# Patient Record
Sex: MALE | Race: WHITE | HISPANIC OR LATINO | Employment: FULL TIME | URBAN - METROPOLITAN AREA
[De-identification: names, ages, dates, MRNs, and addresses within clinical notes are randomized per-mention and may not be internally consistent; named-entity substitution may affect disease eponyms.]

---

## 2017-10-02 ENCOUNTER — OFFICE VISIT - RIVER FALLS (OUTPATIENT)
Dept: FAMILY MEDICINE | Facility: CLINIC | Age: 27
End: 2017-10-02

## 2017-11-13 ENCOUNTER — OFFICE VISIT - RIVER FALLS (OUTPATIENT)
Dept: FAMILY MEDICINE | Facility: CLINIC | Age: 27
End: 2017-11-13

## 2017-11-13 ASSESSMENT — MIFFLIN-ST. JEOR: SCORE: 2227.78

## 2018-03-29 ENCOUNTER — OFFICE VISIT - RIVER FALLS (OUTPATIENT)
Dept: FAMILY MEDICINE | Facility: CLINIC | Age: 28
End: 2018-03-29

## 2018-04-26 ENCOUNTER — OFFICE VISIT - RIVER FALLS (OUTPATIENT)
Dept: FAMILY MEDICINE | Facility: CLINIC | Age: 28
End: 2018-04-26

## 2018-06-20 ENCOUNTER — OFFICE VISIT - RIVER FALLS (OUTPATIENT)
Dept: FAMILY MEDICINE | Facility: CLINIC | Age: 28
End: 2018-06-20

## 2018-06-21 LAB
CREAT SERPL-MCNC: 0.95 MG/DL (ref 0.6–1.35)
GLUCOSE BLD-MCNC: 102 MG/DL (ref 65–139)
HBA1C MFR BLD: 5.2 %

## 2018-08-24 ENCOUNTER — OFFICE VISIT - RIVER FALLS (OUTPATIENT)
Dept: FAMILY MEDICINE | Facility: CLINIC | Age: 28
End: 2018-08-24

## 2018-08-31 ENCOUNTER — OFFICE VISIT - RIVER FALLS (OUTPATIENT)
Dept: FAMILY MEDICINE | Facility: CLINIC | Age: 28
End: 2018-08-31

## 2018-08-31 ASSESSMENT — MIFFLIN-ST. JEOR: SCORE: 2219.62

## 2018-11-24 ENCOUNTER — OFFICE VISIT - RIVER FALLS (OUTPATIENT)
Dept: FAMILY MEDICINE | Facility: CLINIC | Age: 28
End: 2018-11-24

## 2018-11-26 ENCOUNTER — OFFICE VISIT - RIVER FALLS (OUTPATIENT)
Dept: FAMILY MEDICINE | Facility: CLINIC | Age: 28
End: 2018-11-26

## 2019-03-07 ENCOUNTER — OFFICE VISIT - RIVER FALLS (OUTPATIENT)
Dept: FAMILY MEDICINE | Facility: CLINIC | Age: 29
End: 2019-03-07

## 2019-03-11 LAB
AMPHETAMINE - HISTORICAL: 9471 NG/ML
AMPHETAMINE URINE - HISTORICAL: ABNORMAL
AMPHETAMINES UR QL: POSITIVE NG/ML
BARBITURATE URINE - HISTORICAL: ABNORMAL
BARBITURATES UR QL SCN: NEGATIVE NG/ML
BENZODIAZ UR QL SCN: NEGATIVE NG/ML
BENZODIAZEPINES: ABNORMAL
COCAINE METAB URINE - HISTORICAL: NEGATIVE NG/ML
COCAINE METABOLITE: ABNORMAL
COMMENTS: ABNORMAL
CREAT UR-MCNC: 184.5 MG/DL
METHADONE URINE - HISTORICAL: NEGATIVE NG/ML
METHADONE: ABNORMAL
METHAMPHETAMINE,URINE - HISTORICAL: ABNORMAL
METHAMPHETAMINE,URINE - HISTORICAL: NEGATIVE NG/ML
OPIATES UR QL SCN: NEGATIVE NG/ML
OPIATES: ABNORMAL
OXIDANTS URINE: NEGATIVE MCG/ML
OXYCODONE URINE - HISTORICAL: NEGATIVE NG/ML
OXYCODONE: ABNORMAL
PCP (PHENCYCLIDINE) - HISTORICAL: ABNORMAL
PCP UR QL SCN: NEGATIVE NG/ML
PH UR STRIP: 5.2 [PH] (ref 4.5–9)
PRESCRIBED DRUG 1: ABNORMAL
THC 50 URINE - HISTORICAL: NEGATIVE NG/ML
THC METABOLITE: ABNORMAL

## 2019-06-28 ENCOUNTER — OFFICE VISIT - RIVER FALLS (OUTPATIENT)
Dept: FAMILY MEDICINE | Facility: CLINIC | Age: 29
End: 2019-06-28

## 2019-06-28 ASSESSMENT — MIFFLIN-ST. JEOR: SCORE: 2201.48

## 2019-07-05 ENCOUNTER — OFFICE VISIT - RIVER FALLS (OUTPATIENT)
Dept: FAMILY MEDICINE | Facility: CLINIC | Age: 29
End: 2019-07-05

## 2019-07-05 LAB
ALBUMIN UR-MCNC: NEGATIVE G/DL
BILIRUB UR QL STRIP: NEGATIVE
GLUCOSE UR STRIP-MCNC: NEGATIVE MG/DL
HGB UR QL STRIP: NEGATIVE
KETONES UR STRIP-MCNC: NEGATIVE MG/DL
LEUKOCYTE ESTERASE UR QL STRIP: NEGATIVE
NITRATE UR QL: NEGATIVE
PH UR STRIP: NORMAL [PH] (ref 5–8)
SP GR UR STRIP: 1.02 (ref 1–1.03)

## 2019-07-05 ASSESSMENT — MIFFLIN-ST. JEOR: SCORE: 2207.83

## 2019-10-02 ENCOUNTER — OFFICE VISIT - RIVER FALLS (OUTPATIENT)
Dept: FAMILY MEDICINE | Facility: CLINIC | Age: 29
End: 2019-10-02

## 2019-10-02 ASSESSMENT — MIFFLIN-ST. JEOR: SCORE: 2224.16

## 2019-12-02 ENCOUNTER — COMMUNICATION - RIVER FALLS (OUTPATIENT)
Dept: FAMILY MEDICINE | Facility: CLINIC | Age: 29
End: 2019-12-02

## 2019-12-26 ENCOUNTER — COMMUNICATION - RIVER FALLS (OUTPATIENT)
Dept: FAMILY MEDICINE | Facility: CLINIC | Age: 29
End: 2019-12-26

## 2019-12-30 ENCOUNTER — OFFICE VISIT - RIVER FALLS (OUTPATIENT)
Dept: FAMILY MEDICINE | Facility: CLINIC | Age: 29
End: 2019-12-30

## 2019-12-30 ASSESSMENT — MIFFLIN-ST. JEOR: SCORE: 2216.9

## 2020-01-30 ENCOUNTER — COMMUNICATION - RIVER FALLS (OUTPATIENT)
Dept: FAMILY MEDICINE | Facility: CLINIC | Age: 30
End: 2020-01-30

## 2020-04-08 ENCOUNTER — COMMUNICATION - RIVER FALLS (OUTPATIENT)
Dept: FAMILY MEDICINE | Facility: CLINIC | Age: 30
End: 2020-04-08

## 2020-04-17 ENCOUNTER — OFFICE VISIT - RIVER FALLS (OUTPATIENT)
Dept: FAMILY MEDICINE | Facility: CLINIC | Age: 30
End: 2020-04-17

## 2020-05-27 ENCOUNTER — COMMUNICATION - RIVER FALLS (OUTPATIENT)
Dept: FAMILY MEDICINE | Facility: CLINIC | Age: 30
End: 2020-05-27

## 2020-05-28 ENCOUNTER — COMMUNICATION - RIVER FALLS (OUTPATIENT)
Dept: FAMILY MEDICINE | Facility: CLINIC | Age: 30
End: 2020-05-28

## 2020-06-04 ENCOUNTER — COMMUNICATION - RIVER FALLS (OUTPATIENT)
Dept: FAMILY MEDICINE | Facility: CLINIC | Age: 30
End: 2020-06-04

## 2020-06-17 ENCOUNTER — COMMUNICATION - RIVER FALLS (OUTPATIENT)
Dept: FAMILY MEDICINE | Facility: CLINIC | Age: 30
End: 2020-06-17

## 2021-04-20 ENCOUNTER — OFFICE VISIT - RIVER FALLS (OUTPATIENT)
Dept: FAMILY MEDICINE | Facility: CLINIC | Age: 31
End: 2021-04-20

## 2021-04-20 ASSESSMENT — MIFFLIN-ST. JEOR: SCORE: 2156.12

## 2021-08-03 ENCOUNTER — OFFICE VISIT - RIVER FALLS (OUTPATIENT)
Dept: FAMILY MEDICINE | Facility: CLINIC | Age: 31
End: 2021-08-03

## 2021-08-03 ASSESSMENT — MIFFLIN-ST. JEOR: SCORE: 2169.72

## 2021-08-06 LAB
ALBUMIN UR-MCNC: NEGATIVE G/DL
APPEARANCE UR: CLEAR
BILIRUB UR QL STRIP: NEGATIVE
COLOR UR AUTO: YELLOW
GLUCOSE UR STRIP-MCNC: NEGATIVE MG/DL
HGB UR QL STRIP: NEGATIVE
KETONES UR STRIP-MCNC: NEGATIVE MG/DL
LEUKOCYTE ESTERASE UR QL STRIP: NEGATIVE
NITRATE UR QL: NEGATIVE
PH UR STRIP: 5.5 [PH]
SP GR UR STRIP: 1.03
UROBILINOGEN UR STRIP-MCNC: NORMAL MG/DL

## 2021-08-16 ENCOUNTER — OFFICE VISIT - RIVER FALLS (OUTPATIENT)
Dept: FAMILY MEDICINE | Facility: CLINIC | Age: 31
End: 2021-08-16

## 2021-12-02 ENCOUNTER — OFFICE VISIT - RIVER FALLS (OUTPATIENT)
Dept: FAMILY MEDICINE | Facility: CLINIC | Age: 31
End: 2021-12-02

## 2022-01-31 ENCOUNTER — COMMUNICATION - RIVER FALLS (OUTPATIENT)
Dept: FAMILY MEDICINE | Facility: CLINIC | Age: 32
End: 2022-01-31

## 2022-02-11 VITALS
DIASTOLIC BLOOD PRESSURE: 86 MMHG | HEART RATE: 98 BPM | SYSTOLIC BLOOD PRESSURE: 128 MMHG | HEART RATE: 84 BPM | DIASTOLIC BLOOD PRESSURE: 78 MMHG | TEMPERATURE: 98 F | SYSTOLIC BLOOD PRESSURE: 120 MMHG | RESPIRATION RATE: 16 BRPM | TEMPERATURE: 97.4 F | HEIGHT: 75 IN | BODY MASS INDEX: 32.2 KG/M2 | WEIGHT: 260.4 LBS | WEIGHT: 259 LBS | BODY MASS INDEX: 32.38 KG/M2 | HEIGHT: 75 IN

## 2022-02-11 VITALS
HEART RATE: 88 BPM | RESPIRATION RATE: 16 BRPM | WEIGHT: 252 LBS | TEMPERATURE: 98.4 F | DIASTOLIC BLOOD PRESSURE: 80 MMHG | HEIGHT: 75 IN | SYSTOLIC BLOOD PRESSURE: 110 MMHG | BODY MASS INDEX: 31.33 KG/M2

## 2022-02-11 VITALS
DIASTOLIC BLOOD PRESSURE: 82 MMHG | HEIGHT: 75 IN | RESPIRATION RATE: 16 BRPM | BODY MASS INDEX: 32.83 KG/M2 | HEART RATE: 80 BPM | TEMPERATURE: 97.8 F | WEIGHT: 264 LBS | SYSTOLIC BLOOD PRESSURE: 132 MMHG

## 2022-02-11 VITALS
TEMPERATURE: 97.5 F | BODY MASS INDEX: 34.73 KG/M2 | SYSTOLIC BLOOD PRESSURE: 136 MMHG | HEART RATE: 84 BPM | DIASTOLIC BLOOD PRESSURE: 88 MMHG | WEIGHT: 276 LBS | WEIGHT: 274 LBS | SYSTOLIC BLOOD PRESSURE: 126 MMHG | HEART RATE: 76 BPM | BODY MASS INDEX: 34.48 KG/M2 | DIASTOLIC BLOOD PRESSURE: 96 MMHG

## 2022-02-11 VITALS
WEIGHT: 260 LBS | HEART RATE: 68 BPM | WEIGHT: 264.8 LBS | DIASTOLIC BLOOD PRESSURE: 86 MMHG | SYSTOLIC BLOOD PRESSURE: 132 MMHG | SYSTOLIC BLOOD PRESSURE: 122 MMHG | HEIGHT: 75 IN | BODY MASS INDEX: 32.92 KG/M2 | DIASTOLIC BLOOD PRESSURE: 88 MMHG | BODY MASS INDEX: 32.72 KG/M2 | TEMPERATURE: 98.4 F | TEMPERATURE: 98.9 F | HEART RATE: 90 BPM

## 2022-02-11 VITALS
SYSTOLIC BLOOD PRESSURE: 130 MMHG | HEART RATE: 80 BPM | TEMPERATURE: 97.2 F | DIASTOLIC BLOOD PRESSURE: 80 MMHG | BODY MASS INDEX: 32.63 KG/M2 | HEIGHT: 75 IN | WEIGHT: 262.4 LBS

## 2022-02-11 VITALS
DIASTOLIC BLOOD PRESSURE: 84 MMHG | HEART RATE: 79 BPM | WEIGHT: 259 LBS | DIASTOLIC BLOOD PRESSURE: 78 MMHG | WEIGHT: 263 LBS | OXYGEN SATURATION: 98 % | HEIGHT: 75 IN | WEIGHT: 259.6 LBS | RESPIRATION RATE: 16 BRPM | BODY MASS INDEX: 32.67 KG/M2 | SYSTOLIC BLOOD PRESSURE: 124 MMHG | DIASTOLIC BLOOD PRESSURE: 82 MMHG | SYSTOLIC BLOOD PRESSURE: 122 MMHG | TEMPERATURE: 97.3 F | HEART RATE: 80 BPM | TEMPERATURE: 98 F | SYSTOLIC BLOOD PRESSURE: 138 MMHG | BODY MASS INDEX: 32.59 KG/M2 | BODY MASS INDEX: 32.7 KG/M2 | HEART RATE: 88 BPM

## 2022-02-11 VITALS
RESPIRATION RATE: 16 BRPM | DIASTOLIC BLOOD PRESSURE: 78 MMHG | WEIGHT: 249 LBS | TEMPERATURE: 96.3 F | BODY MASS INDEX: 30.96 KG/M2 | HEART RATE: 80 BPM | SYSTOLIC BLOOD PRESSURE: 112 MMHG | HEIGHT: 75 IN

## 2022-02-11 VITALS — BODY MASS INDEX: 31.71 KG/M2 | HEIGHT: 75 IN

## 2022-02-11 VITALS
BODY MASS INDEX: 33.47 KG/M2 | WEIGHT: 266 LBS | DIASTOLIC BLOOD PRESSURE: 90 MMHG | SYSTOLIC BLOOD PRESSURE: 134 MMHG | HEART RATE: 88 BPM

## 2022-02-11 VITALS — BODY MASS INDEX: 33.02 KG/M2 | HEIGHT: 75 IN

## 2022-02-11 VITALS — HEIGHT: 75 IN | BODY MASS INDEX: 31.33 KG/M2

## 2022-02-12 VITALS
SYSTOLIC BLOOD PRESSURE: 136 MMHG | DIASTOLIC BLOOD PRESSURE: 86 MMHG | BODY MASS INDEX: 33.34 KG/M2 | HEART RATE: 84 BPM | WEIGHT: 265 LBS

## 2022-02-16 NOTE — PROGRESS NOTES
Patient:   RODNEY FLETCHER            MRN: [id number]            FIN: [id number]               Age:   28 years     Sex:  Male     :  1990   Associated Diagnoses:   ADD (attention deficit disorder); Elevated blood pressure reading; Back pain   Author:   Amaya Donohue      Visit Information      Date of Service: 2019 10:40 am  Performing Location: [facility]  Encounter#: [id number]      Primary Care Provider (PCP):  Amaya Donohue    NPI# [id number]      Referring Provider:  Amaya Donohue    NPI# [id number]      Chief Complaint   3/7/2019 10:53 AM CST    f/u ADD. Discuss tramadol refill.      History of Present Illness   Chief complaint confirmed and reviewed with patient. Patient comes in today for follow up of ADD and refill of medications.  pdmp shows last dispense of adderall 2/10/19, no documented dispense of tramadol in past 6 months (last refill from me 2018) The patient reports his back pain is somewhat improved, previously 9/10 in severity, now 6-7/10 in severity. He intermittently performs PT exercises at home. He decreased smoking from 1 1/2 ppd to now 1/2 ppd. He does not follow a specific diet noting he drinks about 3 energy drinks per day.  back pain maintained on meloxicam but he will use prn tramadol if pain too intense       Review of Systems   Constitutional:  Negative.    Respiratory:  Negative.    Cardiovascular:  Negative.    Gastrointestinal:  Negative.    Genitourinary:  Negative.    Hematology/Lymphatics:  Negative.    Musculoskeletal:  Negative except as documented in history of present illness.    Neurologic:  Negative.    Psychiatric:  Negative except as documented in history of present illness.       Health Status   Allergies:    Allergic Reactions (Selected)  Severity Not Documented  Iodine (Scratchy throat and swelling)   Medications:  (Selected)   Prescriptions  Prescribed  Adderall 20 mg oral tablet: = 1 tab(s) ( 20 mg ), PO, BID, # 60  tab(s), 0 Refill(s), Type: Maintenance, Pharmacy: [id number], okay to fill 12/24/18, 1 tab(s) Oral bid  Adderall 20 mg oral tablet: = 1 tab(s) ( 20 mg ), PO, BID, # 60 tab(s), 0 Refill(s), Type: Maintenance, Pharmacy: [id number], okay to fill 4/6/19, 1 tab(s) Oral bid  meloxicam 15 mg oral tablet: = 1 tab(s) ( 15 mg ), PO, Daily, # 90 tab(s), 1 Refill(s), Type: Maintenance, Pharmacy: [id number], 1 tab(s) Oral daily  traMADol 50 mg oral tablet: = 1 tab(s) ( 50 mg ), PO, q4hr, PRN: for pain, # 30 tab(s), 0 Refill(s), Type: Maintenance, Pharmacy: [id number], 1 tab(s) Oral q4 hrs,PRN:for pain   Problem list:    All Problems  Cigarette Smoker / ICD-9-.1 / Confirmed  ADD (attention deficit disorder) / SNOMED CT 340116355 / Confirmed  Family History of Diabetes Mellitus (DM) / ICD-9-CM V18.0 / Confirmed  Low back pain / SNOMED CT 387105194 / Confirmed  Obesity / ICD-9-.00 / Probable      Histories   Past Medical History:    Resolved  Back pain ([id number]):  Resolved.   Family History:    Diabetes mellitus  Sister  Brother  Diabetes mellitus - adult onset  Mother  Father     Procedure history:    No active procedure history items have been selected or recorded.   Social History:        Tobacco Assessment: Current            Current, Cigarettes, Snuff      Physical Examination   Vital Signs   3/7/2019 10:53 AM CST Peripheral Pulse Rate 88 bpm    Pulse Site Radial artery    HR Method Manual    Systolic Blood Pressure 134 mmHg  HI    Diastolic Blood Pressure 90 mmHg  HI    Mean Arterial Pressure 105 mmHg    BP Site Right arm    BP Method Manual      Measurements from flowsheet : Measurements   3/7/2019 10:53 AM CST    Weight Measured - Standard                266 lb     General:  Alert and oriented, No acute distress.    HENT:  Normocephalic.    Neck:  Supple.    Respiratory:  Lungs are clear to auscultation, Respirations are non-labored, Breath  sounds are equal.    Cardiovascular:  Normal rate, Regular rhythm, No murmur, No gallop, No edema.    Musculoskeletal:  Normal range of motion.    Integumentary:  Warm, Dry, Pink.    Neurologic:  Alert, Oriented, Normal sensory, Normal motor function.    Psychiatric:  Cooperative, Appropriate mood & affect.       Review / Management   Results review:  Urine drug screen today. .       Impression and Plan   Diagnosis     ADD (attention deficit disorder) (PXH71-CU F98.8).     Plan:  Urine Drug Screen today.   Refilled Adderall today.   Follow up in three months. .    Patient Instructions:       Counseled: Patient, Smoking cessation, Verbalized understanding.    Orders     Orders (Selected)   Prescriptions  Prescribed  Adderall 20 mg oral tablet: = 1 tab(s) ( 20 mg ), PO, BID, # 60 tab(s), 0 Refill(s), Type: Maintenance, Pharmacy: [id number], okay to fill 12/24/18, 1 tab(s) Oral bid  Adderall 20 mg oral tablet: = 1 tab(s) ( 20 mg ), PO, BID, # 60 tab(s), 0 Refill(s), Type: Maintenance, Pharmacy: [facility] [id number], okay to fill 4/6/19, 1 tab(s) Oral bid  meloxicam 15 mg oral tablet: = 1 tab(s) ( 15 mg ), PO, Daily, # 90 tab(s), 1 Refill(s), Type: Maintenance, Pharmacy: [facility] [id number], 1 tab(s) Oral daily  traMADol 50 mg oral tablet: = 1 tab(s) ( 50 mg ), PO, q4hr, PRN: for pain, # 30 tab(s), 0 Refill(s), Type: Maintenance, Pharmacy: [facility] [id number], 1 tab(s) Oral q4 hrs,PRN:for pain.     Diagnosis     Elevated blood pressure reading (SJT67-AN R03.0).     Back pain (TUR17-GX M54.9).     Plan:  Tramadol and Meloxicam have been refilled. Continue PT exercises.     Patient has had elevated blood pressure for several months with readings of 134/90 today and 138/89 in 11/2018. Discussed management options with the patient including lifestyle modifications with diet, exercise and smoking cessation versus decreasing Adderall dosing versus starting antihypertensive medications. As  this point, the patient elected to proceed with lifestyle modifications. Continue to monitor at follow up appointments. .

## 2022-02-16 NOTE — TELEPHONE ENCOUNTER
---------------------  From: Margaret Verma CMA ([facility] Message Pool (32224_[facility]))   To: Amaya Donohue;     Sent: 1/25/2019 11:12:08 AM CST  Subject: adderall     Pt stopped by with CSS note stating he went to  Adderall refill at [facility] but they did not have enough to fill entire order. He is requesting refill to be sent to [facility] in [place] (updated in chart). Is due for a visit next month.---------------------  From: Amaya Donohue   To: [facility] (32224_[facility]);     Sent: 1/25/2019 11:18:07 AM CST  Subject: RE: adderall     donePt notified. Understands appt due next month.

## 2022-02-16 NOTE — PROGRESS NOTES
Patient:   RODNEY FLETCHER            MRN: [id number]            FIN: [id number]               Age:   27 years     Sex:  Male     :  1990   Associated Diagnoses:   ADD (attention deficit disorder); Hypertension; Low back pain; Prediabetes   Author:   Amaya Donohue      Chief Complaint   2018 8:34 AM CDT    f/u ADD and LBP. Has started PT with a friend who is a physical therapist.      History of Present Illness   PPC for LBP: earlene is a PT and  has been helping him, PT out of network is very expensive, in comparison to 2018 it is 15% better, does exercises at night before going to bed, stretches every morning,  able to participate with the kids,  we have written a ltter requesting exclusion from certain drill exercises, he has to turn this into his drill trevin, last letter written for 90d and will need to be updated today  needs refill of his adderall; feels it is beneficial, pdmp and  MN show no refills for pt in past 3 months, I contacted [facility] pharmacist at [facility] who confirmed pt picked up prescription on 18, pt will need to pick this month up early because he has  drill next week  elevated BP: he feels it is from snacking, but slight rise correlates to adderall use      Review of Systems   Constitutional:  Negative.    Respiratory:  Negative.    Cardiovascular:  Negative except as documented in history of present illness.    Gastrointestinal:  Negative.    Genitourinary:  Negative.    Immunologic:  Negative.    Musculoskeletal:       Back pain: In the lower region.    Integumentary:  Negative.    Neurologic:  Negative except as documented in history of present illness.    Psychiatric:  Negative except as documented in history of present illness.              Health Status   Allergies:    Allergic Reactions (Selected)  Severity Not Documented  Iodine (Scratchy throat and swelling)   Medications:  (Selected)   Prescriptions  Prescribed  Adderall 20 mg oral tablet: 1 tab(s)  ( 20 mg ), PO, BID, # 60 tab(s), 0 Refill(s), Type: Maintenance, Pharmacy: [facility] #[id number], 1 tab(s) po bid  Adderall 20 mg oral tablet: 1 tab(s) ( 20 mg ), PO, BID, # 60 tab(s), 0 Refill(s), Type: Maintenance, Pharmacy: [facility] #[id number], okay to fill 5/25/18, 1 tab(s) po bid  meloxicam 15 mg oral tablet: 1 tab(s) ( 15 mg ), PO, Daily, # 90 tab(s), 1 Refill(s), Type: Maintenance, Pharmacy: [facility] #[id number], 1 tab(s) po daily  traMADol 50 mg oral tablet: 1 tab(s) ( 50 mg ), PO, q4hr, PRN: for pain, # 30 tab(s), 0 Refill(s), Type: Maintenance, Pharmacy: [facility] #[id number], 1 tab(s) po q4 hrs,PRN:for pain   Problem list:    All Problems (Selected)  ADD (attention deficit disorder) / SNOMED CT 191595613 / Confirmed  Cigarette Smoker / ICD-9-.1 / Confirmed  Family History of Diabetes Mellitus (DM) / ICD-9-CM V18.0 / Confirmed  Low back pain / SNOMED CT 359358826 / Confirmed  Obesity / ICD-9-.00 / Probable      Physical Examination   Vital Signs   6/20/2018 8:34 AM CDT Peripheral Pulse Rate 84 bpm    Pulse Site Radial artery    HR Method Manual    Systolic Blood Pressure 136 mmHg  HI    Diastolic Blood Pressure 86 mmHg  HI    Mean Arterial Pressure 103 mmHg    BP Site Right arm    BP Method Manual      General:  Alert and oriented, No acute distress.    Eye:  Pupils are equal, round and reactive to light.    HENT:  Normocephalic.    Neck:  Supple.    Musculoskeletal:  Normal range of motion.    Integumentary:  Warm, Dry, Pink.    Neurologic:  Alert, Oriented, Normal sensory, No focal deficits.    Psychiatric:  Cooperative, Appropriate mood & affect, Normal judgment, Non-suicidal.       Impression and Plan   Diagnosis     ADD (attention deficit disorder) (NTL34-RU F98.8).     Hypertension (XTX76-XD I10).     Low back pain (KDK70-KQ M54.5).     Prediabetes (RUO71-FI R73.03).     Patient Instructions:       Counseled: Patient, Regarding diagnosis, Regarding medications, Diet, Activity,  Verbalized understanding.    Summary:  1.  exercise restriction letter rewritten for  drills, encouraged pt to continue to use lower back exercises  2.  adderall refilled now that I was able to confirm dispense with [facility].  however, BP has been elevated at past two visits, if it continues to rise there is concern this is from adderall and medication may need to be stopped, pt feels it is from snacking but I have pointed out that he has lost weight not gained it.  he will try a healthier diet and is attempted to stop smoking, the BP will need to be monitored and labs were drawn today  3.  we will check hgba1c today and if elevated will need to have this addressed.

## 2022-02-16 NOTE — TELEPHONE ENCOUNTER
---------------------  From: Sera Christy LPN (Phone Messages Pool (32224_[facility]))   To: [facility] Message Pool (32224_[facility]);     Sent: 1/25/2019 4:17:09 PM CST  Subject: Adderall       Phone Message    PCP: NBA    Time of call: 4:05pm message left    Person calling: Kameron   Contact # : [phone], ok LM    MESSAGE: Pt calls asking to have his rx sent to another pharmacy. It was sent to [facility] but they do not accept his insurance. He would instead like it sent to [facility] [place].    CSA signed 4/26/18    Adderall 20mg 1 tab po bid #60+0, prescribed 1/25/19    Last visit/reason: 11/26/18 - ADD, back pain---------------------  From: Margaret Verma CMA ([facility] Message Pool (32224_[facility]))   To: Amaya Donohue;     Sent: 1/25/2019 4:27:36 PM CST  Subject: FW: Adderall     New pharmacy updated.---------------------  From: Amaya Donohue   To: [facility] Message Pool (32224_[facility]);     Sent: 1/25/2019 5:21:07 PM CST  Subject: RE: Adderall     donePt notified.

## 2022-02-16 NOTE — TELEPHONE ENCOUNTER
---------------------  From: Deandra Zepeda LPN (Phone Messages Pool (32224_[facility]))   To: Advanced Practice Provider Pool (32224_[facility]);     Sent: 2020 1:13:58 PM CST  Subject: Adderall     Phone Message    PCP:   [facility]ECK      Time of Call:  12:34pm       Person Calling:  Artur Owusu  Phone number:  [phone]    Note:   [facility] LM stating [facility] had sent over Rx's for Adderall on . Pt had a Rx to fill on  but was out of town and did not . [facility] says Rx has now  because it was sent over 60 days ago.    [facility] says pt is leaving  for  reasons and is asking to get a new Rx sent over today being he did not fill  Rx.    Last office visit and reason:  19 ADHD/Back Pain---------------------  From: Dorothy Beaulieu (Advanced Practice Provider Pool (32224_[facility]))   To: Phone Messages Pool (32224_[place] [facility] [place]);     Sent: 2020 1:38:06 PM CST  Subject: RE: Adderall     rx sent  The [place] Prescription Drug Monitoring Program website was reviewed today and supports use of this medication in compliance with the controlled substance agreement.noted
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
## 2022-02-16 NOTE — TELEPHONE ENCOUNTER
"---------------------  From: Deandra Zepeda LPN (Phone Messages Pool (32224_[facility]))   To: [facility] Message Pool (32224_[facility]);     Sent: 5/27/2020 4:26:21 PM CDT  Subject: CONSUMER MESSAGE FW: Letter of stabilization for  deployment           ---------------------  From: RODNEY FLETCHER  To: [facility]  Sent: 05/27/2020 04:21 p.m. CDT  Subject: Letter of stabilization for  deployment  This message is for BILL Dwyer    I m needing a letter of \"stability\" for my adderall. 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 They gave me an example that I attached. I am currently down in [place], [place] prepping for deployment to [facility] and am able to deploy and receive the adderall over there but I can t deploy without the letter from you.    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 Without it I will be kicked off this deployment and sent home. If there is ANY way possible you could send the letter as soon as possible I would greatly appreciate it. The deadline to have it is Friday so I really hope that works and I appreciate your time and effort on this matter!    It can be faxed to  Rosy Garrison BSN, RN  FAX: [phone]  Office: [phone]    My email  [email or web address]    Thank you again so much for your help---------------------  From: Mala Harris LPN ([employer] Message Pool (32224_[facility]))   To: Jonny Harrell PA-C;     Sent: 5/27/2020 4:41:48 PM CDT  Subject: FW: CONSUMER MESSAGE FW: Letter of stabilization for  deployment---------------------  From: Julio Cesar KOLB, Jonny GOODWIN   To: [facility] Message Pool (32224_[facility]); Kwame Brand CMA;     Sent: 5/27/2020 5:18:00 PM CDT  Subject: RE: CONSUMER MESSAGE FW: Letter of stabilization for  deployment     Letter is written and " faxed 5/27/2020, [address]onough wrote and faxed your letter 5/27/20 to [phone] Attn:  CPT Rosy Wilson.  Please let us know if they did not receive it and we will fax again.  Thank you and have a nice day.  Kwame Brand CMA---------------------  From: Kwame Brand CMA   To: RODNEY FLETCHER    Sent: 5/28/2020 8:41:40 AM CDT  Subject: FW: CONSUMER MESSAGE FW: Letter of stabilization for  deployment

## 2022-02-16 NOTE — TELEPHONE ENCOUNTER
---------------------  From: Tracy Shipley (Appointment Pool ([id number]_[facility]))   To: KAMERON FLETCHER    Sent: 2020 3:11:12 PM CDT  Subject: RE: Appointment Request     Hi Kameron,    Please call our business office at [phone] regarding an appointment her at [facility].    Thank you,  Patient Services        ---------------------  From: KAMERON FLETCHER  To: [facility]  Sent: 2020 02:14 p.m. CDT  Subject: Appointment Request  Patient Name: NELLYSHELLEY CHANCE  Patient : 1990  Preferred Provider: Jonny Harrell  Appointment Dates: First Available Appointment  Preferred Days: Any day  Preferred Time:   Contact Patient by: Phone - [phone]    Reason for Visit:    Medication re-evaluate and refill

## 2022-02-16 NOTE — NURSING NOTE
Phone Message    PCP: NBA    Time of call: 4:30pm message was left    Person calling: Kameron  Contact # : [phone], ok LM    MESSAGE: Pt states he was given an note for the [facility] but has lost it. He was wondering if this could be reprinted. He is leaving for [place] at the end of the week and needs to have this with him.    Last visit/reason: 6/20/18 - back pain    4:56pm Called and spoke with pt. He is looking for the note that excuses/restricts certain activities. I was able to find. NBA is out until 7/19/18. NCB signed the note and hopefully this will be good enough. Pt told I will leave this at the  for him to .
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
## 2022-02-16 NOTE — TELEPHONE ENCOUNTER
"---------------------  From: Shilpi Wilson RN (Phone Messages Pool ([id number]_[facility]))   To: [facility] Message Pool ([id number]);     Sent: 6/17/2020 1:05:59 PM CDT  Subject: FW: Kameron Epstein [facility] Waiver           ---------------------  From: KAMERON EPSTEIN  To: [facility]  Sent: 06/17/2020 01:04 p.m. CDT  [facility]  This message is for Dr. Jonny Harrell    First I want to say I appreciate your help so much with all the documentation you ve sent in order for me to deploy to [facility]! I can t thank you enough.    Unfortunately, the [facility] requires just 1 more thing to fully clear me. Their email said:    \"Please provide full dose information and signature for current ADHD prescription as well as current medication list to validate ADHD medication is currently the only authorized and prescribed medication at this time\"    I really appreciate the help, according to the [employer] once I get this list with adderall being the only medication on it and the full dose information with signature I should be 100% good to go.    It can be uploaded to this gordon where all my other letters and information have been. There is no need to fax it anymore, just uploaded here.    Thank you,  Kameron Jimenez---------------------  From: Kwame Brand CMA ([employer] Message Pool ([id number]_[facility]))   To: Jonny Harrell PA-C;     Sent: 6/17/2020 1:25:02 PM CDT  Subject: FW: Kameron Epstein [facility] Waiver---------------------  From: Jonny Harrell PA-C   To: [facility] Message Pool ([id number]);     Sent: 6/17/2020 1:32:04 PM CDT  Subject: RE: Kameron Epstein [facility]     Any ideas on how to do this?  I have updated his med list.  Can we bring his med list into a patient letter format so that   he can access it through the portal?  And from now on please tell him to have his medical officer contact me directly.  This process has been   burdensome and " unnecessary.Jesús Montalvo updated your medication list and only the adderall is on it.  You should be able to access this through the portal.  Jesús would like the medical officer to call him(clinic number is [phone] ask to have Jesús Paged overhead) if there are any continued problems.  Thank you and have a nice day.  Kwame Brand CMA---------------------  From: Kwame Brand CMA ([facility] Message Pool ([id number]_[facility]))   To: RODNEY FLETCHER    Sent: 6/17/2020 2:50:09 PM CDT  Subject: FW: [facility]
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
## 2022-02-16 NOTE — TELEPHONE ENCOUNTER
"---------------------  From: Shilpi Wilson RN (Phone Messages Pool (32224_[facility]))   To: LONNIE Message Pool (32224_[facility]);     Sent: 2020 8:20:26 AM CDT  Subject: FW: Selvin Fletcher Deployment Information           ---------------------  From: RODNEY FLETCHER  To: [facility]  Sent: 2020 07:32 p.m. 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 I am more than capable of deploying without it as I do not use it regularly. The last time I used it was the beginning of February. I would like to discontinue my prescription as tramadol if possible. 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 I hope it all makes sense.    Lucian,    Please get the ADHD documentation stated below from your primary care provider (This is a direct annotation from our conversation and this information is only to be sent from your provider after their professional consultation - this is simply to help respond to the Waiver and is by no means a professional medical opinion/diagnosis/prognosis):    \"Per MOD 15, Tab A, please provide documentation of objective testing and/or clinical evaluation that supports the diagnosis of ADHD. Include the documentation from the initial diagnosis in 2017.\"    As far as the Tramadol, chronic and ongoing opioid use is disqualifying for deployment to [facility]. Please get a letter from your doctor to the effect of:    The patient, after careful review of the previous  prescription and lack of use of it, can function without the opioid and is to discontinue the medication. The Independence last consumed the prescription on 2020. The [place] is prescribed to take it 3 times a " week but has no need to do so, and has not done so. This letter is to confirm stability from the 5th day of February, 2020 to the present date and the medication is to be discontinued from the patient s medical records as of 02/05/2020. The patient is completely able to perform their duty of work scope without this prescription. Able to deploy immediately.    Also, please have your provider follow the template as to what additional information is to be provided in the LOS for the Tramodol to specifically include:    - Medications: History, dosages, length, frequency of prescription and actual use, and actual need at present time  - Course of Diagnosis: Initial course of diagnosis up to present day including response and changes to the diagnosis to include discontinuation of prescription  - Prognosis: How the patient will perform and respond in an austere environment and how a follow-on appointment will not be necessary through the duration of the deployment.  - No active thoughts of homicide or suicideSelvin Jonny Harrell is out of clinic until 6/8/20 and will not be able to address this until then.  I will forward the message to him Monday.  Thank you Kwame Brand CMA---------------------  From: Kwame Brand CMA ([facility] Pool [facility]   To: RODNEY FLETCHER    Sent: 6/4/2020 8:39:02 AM CDT  Subject: FW: Selvin BradshawJesús got your message but is having some trouble with what exactly you need done.  Is there a phone number of a medical person he could call and discuss this with where you are at?  Kwame Brand CMA---------------------  From: Kwame Brand CMA ([facility]ono Pool [facility]   To: RODNEY FLETCHER    Sent: 6/8/2020 9:36:38 AM CDT  Subject: FW: Selvin Cotter

## 2022-02-16 NOTE — TELEPHONE ENCOUNTER
---------------------  From: Deandra Zepeda LPN (Phone Messages Pool (32224_[facility]))   To: DW[facility] Message Pool (32224_[facility]);     Sent: 6/18/2021 9:35:02 AM CDT  Subject: Adderall refill     Phone Message    PCP:   BLADIMIR      Time of Call:  9:30am       Person Calling:  pt  Phone number:  [phone]    Note:   Pt calling requesting Adderall refill.    Adderall 20mg 1 tab PO TID #90- 2 Rx's given 4/20    Per note pt give 2 Rx's and can call for 3rd and 4th month.  RTC placed today.    Last office visit and reason:  4/20/21 ADD adult---------------------  From: Kwame Brand CMA ([facility] Message Pool (32224_[facility]))   To: Jonny Harrell PA-C;     Sent: 6/18/2021 9:56:32 AM CDT  Subject: FW: Adderall refill---------------------  From: Jonny Harrell PA-C   To: [facility] Message Pool (32224_[facility]);     Sent: 6/18/2021 10:45:21 AM CDT  Subject: RE: Adderall refill     Rxs for June and July sent in, [place] PDMP consulted, no irregularities noted  due for clinic visit in AugustI called pt and gave him DWG message.  Kwame Brand CMA

## 2022-02-16 NOTE — PROGRESS NOTES
Patient:   RODNEY FLETCHER            MRN: [id number]            FIN: [id number]               Age:   28 years     Sex:  Male     :  1990   Associated Diagnoses:   Avulsion fracture of distal phalanx of finger; Subungual hematoma, fingernail   Author:   Dorothy Beaulieu      Chief Complaint   2018 11:47 AM CST  Patient is here for right ring finger injury. States he smashed it yesterday is concerned with fracture.      History of Present Illness   confirmed chief complaint with patient  injury about 12 hours ago, slipped on stairs and smashed hand rail that tore off onto his finger into the wooden steps at a restaurant  went home to bed but it has been throbbing terribly, very painful, used no OTC meds , tried ice  worried about fracture         Review of Systems             Health Status   Allergies:    Allergic Reactions (Selected)  Severity Not Documented  Iodine (Scratchy throat and swelling)   Medications:  (Selected)   Prescriptions  Prescribed  Adderall 20 mg oral tablet: = 1 tab(s) ( 20 mg ), PO, BID, # 60 tab(s), 0 Refill(s), Type: Maintenance, Pharmacy: [id number] to [facility], 1 tab(s) Oral bid  Adderall 20 mg oral tablet: = 1 tab(s) ( 20 mg ), PO, BID, Instructions: fill on or after 2018, # 60 tab(s), 0 Refill(s), Type: Maintenance, Pharmacy: [id number] to fill 18 ( has drill next week), 1 tab(s) Oral bid,Instr:fill on or after 2018  meloxicam 15 mg oral tablet: 1 tab(s) ( 15 mg ), PO, Daily, # 90 tab(s), 1 Refill(s), Type: Maintenance, Pharmacy: [id number], 1 tab(s) po daily  traMADol 50 mg oral tablet: 1 tab(s) ( 50 mg ), PO, q4hr, PRN: for pain, # 30 tab(s), 0 Refill(s), Type: Maintenance, Pharmacy: [id number], 1 tab(s) po q4 hrs,PRN:for pain   Problem list:    All Problems  Obesity / ICD-9-.00 / Probable  Cigarette Smoker / ICD-9-.1 / Confirmed  Family History of Diabetes Mellitus (DM) / ICD-9-CM  V18.0 / Confirmed  Low back pain / SNOMED CT 846717654 / Confirmed  ADD (attention deficit disorder) / SNOMED CT 976226838 / Confirmed  Resolved: Back pain / SNOMED CT 6539936335      Histories   Past Medical History:    Resolved  Back pain ([id number]):  Resolved.   Family History:    Diabetes mellitus  Sister  Brother  Diabetes mellitus - adult onset  Mother  Father     Procedure history:    No active procedure history items have been selected or recorded.   Social History:        Tobacco Assessment: Current            Current, Cigarettes, Snuff        Physical Examination   Vital Signs   11/24/2018 11:47 AM CST Temperature Tympanic 98.0 DegF    Peripheral Pulse Rate 79 bpm    HR Method Electronic    Systolic Blood Pressure 124 mmHg    Diastolic Blood Pressure 82 mmHg  HI    Mean Arterial Pressure 96 mmHg    BP Site Right arm    BP Method Manual    Oxygen Saturation 98 %      Measurements from flowsheet : Measurements   11/24/2018 11:47 AM CST  Weight Measured - Standard                259.6 lb     General:  Alert and oriented, right 4th finger has echymosis on the palmar aspect of the pad the 95% of the nail has a subungal hematoma.       Review / Management   Radiology results   X-ray, small avulsion fracture of the distal tip, discuss with [facility] on call Dr Lockett, discussed with pt   Course:  explained to him the risk/benefit of nail Trephination and he would like to proceed  I offered him a digital block as he is very tender but he declined  he is allergic to betadine so heated cautery was not used  instead, nail was cleaned with alcohol pad and an #18 gauge sterile needle was spun with slight pressure till it passed through the nail and bleeing begun through the hole. A second release was created  4 mm from the first, both mid nail, tolerated well. Cleaned, felt some pain relief, dressed with dry sterile 3x3  He was discharged with instruction no ointments but should soak in salt water 10 min twice a day  for 1-2 days then cover and use blue foamed double sided finger tip splint given to him to protect nail and immobilize tip to promote healing  WIll use ibuprofen for pain.       Impression and Plan   Diagnosis     Avulsion fracture of distal phalanx of finger (HNE38-ER S62.639A).     Subungual hematoma, fingernail (SOA96-QE S60.10XA).     Patient Instructions:       Counseled: Patient, Regarding diagnosis, Regarding treatment, Regarding medications, Verbalized understanding, Counseled on symptomatic management. Return to clinic for re evaluation if worsening, simply not improving, or failure to resolve.   .

## 2022-02-16 NOTE — PROGRESS NOTES
Patient:   RODNEY FLETCHER            MRN: [id number]            FIN: [id number]               Age:   29 years     Sex:  Male     :  1990   Associated Diagnoses:   Encounter for examination required by Department of Transportation (DOT)   Author:   Kelvin CAMPBELL, Dorothy      Chief Complaint   2019 10:38 AM CDT    Patient presents for DOT physical        History of Present Illness   here for DOT physical, please see scanned history/Physical exam      Health Status   Allergies:    Allergic Reactions (Selected)  Severity Not Documented  Iodine (Scratchy throat and swelling)   Medications:  (Selected)   Prescriptions  Prescribed  Adderall 20 mg oral tablet: = 1 tab(s) ( 20 mg ), PO, BID, Instructions: fill on or after 2019, # 60 tab(s), 0 Refill(s), Type: Maintenance, Pharmacy: [id number], okay to fill 19, 1 tab(s) Oral bid,Instr:fill on or after 2019  Adderall 20 mg oral tablet: = 1 tab(s) ( 20 mg ), PO, BID, Instructions: fill on or after 2019, # 60 tab(s), 0 Refill(s), Type: Maintenance, Pharmacy: [id number], 1 tab(s) Oral bid,Instr:fill on or after 2019  meloxicam 15 mg oral tablet: = 1 tab(s) ( 15 mg ), PO, Daily, # 90 tab(s), 3 Refill(s), Type: Maintenance, Pharmacy: [id number], 1 tab(s) Oral daily  traMADol 50 mg oral tablet: = 1 tab(s) ( 50 mg ), PO, q4hr, PRN: for pain, # 30 tab(s), 0 Refill(s), Type: Maintenance, Pharmacy: [id number], 1 tab(s) Oral q4 hrs,PRN:for pain   Problem list:    All Problems  ADD (attention deficit disorder) / SNOMED CT 725474293 / Confirmed  Cigarette Smoker / ICD-9-.1 / Confirmed  Family History of Diabetes Mellitus (DM) / ICD-9-CM V18.0 / Confirmed  Low back pain / SNOMED CT 618827029 / Confirmed  Obesity / ICD-9-.00 / Probable  Resolved: Back pain / SNOMED CT 0479717059      Histories   Past Medical History:    Resolved  Back pain ([id number]):  Resolved.   Family History:     Diabetes mellitus  Sister  Brother  Diabetes mellitus - adult onset  Mother  Father     Procedure history:    No active procedure history items have been selected or recorded.   Social History:        Tobacco Assessment: Current            Current, Cigarettes, Snuff      Physical Examination   Vital Signs   7/5/2019 10:38 AM CDT Temperature Tympanic 97.4 DegF  LOW    Peripheral Pulse Rate 84 bpm    Pulse Site Radial artery    HR Method Manual    Systolic Blood Pressure 120 mmHg    Diastolic Blood Pressure 78 mmHg    Mean Arterial Pressure 92 mmHg    BP Site Right arm    BP Method Manual      Measurements from flowsheet : Measurements   7/5/2019 10:38 AM CDT Height Measured - Standard 74.75 in    Weight Measured - Standard 260.4 lb    BSA 2.49 m2    Body Mass Index 32.76 kg/m2  HI      General:  Alert and oriented.    Eye:  Pupils are equal, round and reactive to light, Extraocular movements are intact, Normal conjunctiva, vision and hearing exam acceptable to ACOEM standards.    HENT:  Tympanic membranes are clear, Oral mucosa is moist, No pharyngeal erythema, No sinus tenderness.    Neck:  Supple, Non-tender, No lymphadenopathy, No thyromegaly, adequate ROM.    Respiratory:  Lungs are clear to auscultation, Respirations are non-labored, Breath sounds are equal, Symmetrical chest wall expansion, No chest wall tenderness.    Cardiovascular:  Normal rate, Regular rhythm, No murmur, No gallop, Normal peripheral perfusion.    Gastrointestinal:  Soft, Non-tender, Non-distended, No organomegaly.    Genitourinary:  No costovertebral angle tenderness, no evidence of hernia.    Musculoskeletal:  Normal range of motion, Normal strength, No tenderness, No swelling, No deformity, Normal gait.    Integumentary:  Warm, Dry, Pink, No rash.    Neurologic:  Alert, Oriented, Normal sensory, Normal motor function, No focal deficits, Cranial Nerves II-XII are grossly intact, Normal deep tendon reflexes.    Psychiatric:  Cooperative,  Appropriate mood & affect, Normal judgment, Non-suicidal.       Review / Management   Results review:  Lab results   7/5/2019 10:52 AM CDT UA pH < OR = 5.0    UA Specific Gravity 1.025    UA Glucose NEGATIVE    UA Bilirubin NEGATIVE    UA Ketones NEGATIVE    Urine Occult Blood NEGATIVE    UA Protein NEGATIVE    UA Nitrite NEGATIVE    UA Leukocyte Esterase NEGATIVE   .       Impression and Plan   Diagnosis     Encounter for examination required by Department of Transportation (DOT) (SWE92-SP Z02.89).     Patient Instructions:       Counseled: Patient, Regarding diagnosis, Regarding treatment, Regarding medications, Verbalized understanding.    Orders     See scanned document for specifics regarding DOT clearance.

## 2022-02-16 NOTE — NURSING NOTE
Comprehensive Intake Entered On:  4/20/2021 7:48 AM CDT    Performed On:  4/20/2021 7:41 AM CDT by Kwame Brand CMA               Summary   Chief Complaint :   Pt here for an ADHD medcheck.   Weight Measured :   249 lb(Converted to: 249 lb 0 oz, 112.944 kg)    Height Measured :   74.75 in(Converted to: 6 ft 3 in, 189.86 cm)    Body Mass Index :   31.33 kg/m2 (HI)    Body Surface Area :   2.44 m2   Systolic Blood Pressure :   112 mmHg   Diastolic Blood Pressure :   78 mmHg   Mean Arterial Pressure :   89 mmHg   Peripheral Pulse Rate :   80 bpm   BP Site :   Right arm   Pulse Site :   Radial artery   Temperature Tympanic :   96.3 DegF(Converted to: 35.7 DegC)  (LOW)    Respiratory Rate :   16 br/min   Languages :   English   Kwame Brand CMA - 4/20/2021 7:41 AM CDT   Health Status   Allergies Verified? :   Yes   Medication History Verified? :   Yes   Medical History Verified? :   Yes   Pre-Visit Planning Status :   Completed   Tobacco Use? :   Current every day smoker   Tobacco Cessation Review :   Not ready to quit   Kwame Brand CMA - 4/20/2021 7:41 AM CDT   Meds / Allergies   (As Of: 4/20/2021 7:48:07 AM CDT)   Allergies (Active)   iodine  Estimated Onset Date:   Unspecified ; Reactions:   scratchy throat, Swelling ; Created By:   Vonda Shaw; Reaction Status:   Active ; Category:   Drug ; Substance:   iodine ; Type:   Allergy ; Updated By:   Vonda Shaw; Reviewed Date:   4/17/2020 1:55 PM CDT        Medication List   (As Of: 4/20/2021 7:48:07 AM CDT)   Prescription/Discharge Order    amphetamine-dextroamphetamine  :   amphetamine-dextroamphetamine ; Status:   Prescribed ; Ordered As Mnemonic:   Adderall 20 mg oral tablet ; Simple Display Line:   20 mg, 1 tab(s), PO, BID, fill on or after 4/17/2020, 60 tab(s), 0 Refill(s) ; Ordering Provider:   Jonny Harrell PA-C; Catalog Code:   amphetamine-dextroamphetamine ; Order Dt/Tm:   4/17/2020 2:12:46 PM CDT          amphetamine-dextroamphetamine  :    amphetamine-dextroamphetamine ; Status:   Prescribed ; Ordered As Mnemonic:   Adderall 20 mg oral tablet ; Simple Display Line:   20 mg, 1 tab(s), PO, BID, fill on or afte 5/15/2020, 60 tab(s), 0 Refill(s) ; Ordering Provider:   Julio Cesar KOLB, Jonny GOODWIN; Catalog Code:   amphetamine-dextroamphetamine ; Order Dt/Tm:   4/17/2020 2:13:29 PM CDT            ID Risk Screen   Recent Travel History :   Last travel within 21 days   Family Member Travel History :   No recent travel   Other Exposure to Infectious Disease :   Unknown   COVID-19 Testing Status :   No positive COVID-19 test   Kwame Brand CMA - 4/20/2021 7:41 AM CDT   Social History   Social History   (As Of: 4/20/2021 7:48:07 AM CDT)   Tobacco:  Current      4 or less cigarettes(less than 1/4 pack)/day in last 30 days   Comments:  4/17/2020 1:54 PM - Kwame Brand CMA: Pt smokes a pack a week.   (Last Updated: 4/20/2021 7:42:06 AM CDT by Kwame Brand CMA)          Electronic Cigarette/Vaping:        Electronic Cigarette Use: Never.   (Last Updated: 4/20/2021 7:42:09 AM CDT by Kwame Brand CMA)

## 2022-02-16 NOTE — LETTER
(Inserted Image. Unable to display)           March 16, 2019        RODNEY FLETCHER  [address], [place] [id number]        Dear RODNEY,     Thank you for choosing [facility] for your healthcare needs. Below you will find the results of your recent test(s) done at our clinic.      normal for medication      Result Name Current Result Reference Range   U pH  5.2 3/7/2019 4.5 - 9.0   U Creatinine (mg/dL)  184.5 3/7/2019 > or = 20.0 -    Pain Management Prescribed Drug 1  Adderall(TM) 3/7/2019    U Amph Scrn (ng/mL) ((H)) 9,471 3/7/2019  - <250   U Amphetamines (ng/mL) ((A)) POSITIVE 3/7/2019  - <500   U Amph medMATCH  CONSISTENT 3/7/2019    U Barbiturate Scrn (ng/mL)  NEGATIVE 3/7/2019  - <300   U Tiana medMATCH  CONSISTENT 3/7/2019    U Benzodia Scrn (ng/mL)  NEGATIVE 3/7/2019  - <100   U Benzodia medMATCH  CONSISTENT 3/7/2019    U Cannabis Metabolite medMATCH  CONSISTENT 3/7/2019    U Cannabis Metabolite Scrn (ng/mL)  NEGATIVE 3/7/2019  - <20   U Cocaine Metabolite Scrn (ng/mL)  NEGATIVE 3/7/2019  - <150   U Cocaine Metabolite medMATCH  CONSISTENT 3/7/2019    U medMATCH Comments  See comment 3/7/2019    U Methadone Scrn (ng/mL)  NEGATIVE 3/7/2019  - <100   U Methadone Scrn medMATCH  CONSISTENT 3/7/2019    U Methamphetamines Scrn (ng/mL)  NEGATIVE 3/7/2019  - <250   U Methamph medMATCH  CONSISTENT 3/7/2019    U Opiates Scrn (ng/mL)  NEGATIVE 3/7/2019  - <100   U Opiates medMATCH  CONSISTENT 3/7/2019    U Oxidants (mcg/mL)  NEGATIVE 3/7/2019  - <200   U Oxycodone Scrn (ng/mL)  NEGATIVE 3/7/2019  - <100   U Oxycodone Scrn medMATCH  CONSISTENT 3/7/2019    U PCP Scrn (ng/mL)  NEGATIVE 3/7/2019  - <25   U PCP Scrn medMATCH  CONSISTENT 3/7/2019        Please contact me or my assistant at [phone] if you have any questions or concerns.     Sincerely,        CEDRIC Yeung    What do your labs mean?  Below is a glossary of commonly ordered labs:  LDL - Bad Cholesterol  HDL- Good  Cholesterol  AST/ALT- Liver Function  Cr/Creatinine - Kidney Function  Microalbumin - Kidney Function  BUN - Kidney Function  PSA - Prostate   TSH - Thyroid  HgbA1c - Diabetes Test  Hgb (Hemoglobin) - Red Blood Cells

## 2022-02-16 NOTE — TELEPHONE ENCOUNTER
---------------------  From: Eloise Walker LPN   To: [facility] Message Pool (32224_[facility] [place][note 2]); Julio Cesar KOLB, Jonny GOODWIN;     Sent: 6/8/2020 11:41:02 AM CDT  Subject: Call back     PCP:   Ady GARRISON      Time of Call:  _   1115       Person Calling:  _ Kwame Krishnamurthy  Phone number:  _ [phone]    Note:   _ Lt. Kwame Krishnamurthy is requesting a call back from [facility] spoke with Kwame Krishnamurthy per pt request.  Kwame Brand CMA

## 2022-02-16 NOTE — NURSING NOTE
Comprehensive Intake Entered On:  12/30/2019 8:02 AM CST    Performed On:  12/30/2019 7:59 AM CST by Afia Lawson CMA               Summary   Chief Complaint :   ADD med check   Weight Measured :   262.4 lb(Converted to: 262 lb 6 oz, 119.02 kg)    Height Measured :   74.75 in(Converted to: 6 ft 3 in, 189.86 cm)    Body Mass Index :   33.01 kg/m2 (HI)    Body Surface Area :   2.5 m2   Systolic Blood Pressure :   130 mmHg   Diastolic Blood Pressure :   80 mmHg   Mean Arterial Pressure :   97 mmHg   Peripheral Pulse Rate :   80 bpm   BP Site :   Right arm   Pulse Site :   Radial artery   BP Method :   Manual   HR Method :   Manual   Temperature Tympanic :   97.2 DegF(Converted to: 36.2 DegC)  (LOW)    Languages :   English   Afia Lawson CMA - 12/30/2019 7:59 AM CST   Health Status   Allergies Verified? :   Yes   Medication History Verified? :   Yes   Medical History Verified? :   No   Pre-Visit Planning Status :   Completed   Tobacco Use? :   Current some day smoker   Afia Lawson CMA - 12/30/2019 7:59 AM CST   Consents   Consent for Immunization Exchange :   Consent Granted   Consent for Immunizations to Providers :   Consent Granted   Afia Lawson CMA - 12/30/2019 7:59 AM CST   Meds / Allergies   (As Of: 12/30/2019 8:02:58 AM CST)   Allergies (Active)   iodine  Estimated Onset Date:   Unspecified ; Reactions:   scratchy throat, Swelling ; Created By:   Vonda Shaw; Reaction Status:   Active ; Category:   Drug ; Substance:   iodine ; Type:   Allergy ; Updated By:   Vonda Shaw; Reviewed Date:   12/30/2019 8:01 AM CST        Medication List   (As Of: 12/30/2019 8:02:58 AM CST)   Prescription/Discharge Order    amphetamine-dextroamphetamine  :   amphetamine-dextroamphetamine ; Status:   Prescribed ; Ordered As Mnemonic:   Adderall 20 mg oral tablet ; Simple Display Line:   20 mg, 1 tab(s), PO, BID, fill on or after 12/2/2019, 60 tab(s), 0 Refill(s) ; Ordering Provider:   Jonny Harrell PA-C; Catalog  Code:   amphetamine-dextroamphetamine ; Order Dt/Tm:   12/2/2019 1:40:18 PM CST          amphetamine-dextroamphetamine  :   amphetamine-dextroamphetamine ; Status:   Prescribed ; Ordered As Mnemonic:   Adderall 20 mg oral tablet ; Simple Display Line:   20 mg, 1 tab(s), PO, BID, fill on or after 11/1/2019, 60 tab(s), 0 Refill(s) ; Ordering Provider:   Jonny Harrell PA-C; Catalog Code:   amphetamine-dextroamphetamine ; Order Dt/Tm:   10/2/2019 6:41:07 PM CDT          amphetamine-dextroamphetamine  :   amphetamine-dextroamphetamine ; Status:   Prescribed ; Ordered As Mnemonic:   Adderall 20 mg oral tablet ; Simple Display Line:   20 mg, 1 tab(s), PO, BID, fill on or after 10/2/2019, 60 tab(s), 0 Refill(s) ; Ordering Provider:   Jonny Harrell PA-C; Catalog Code:   amphetamine-dextroamphetamine ; Order Dt/Tm:   10/2/2019 6:40:44 PM CDT          meloxicam  :   meloxicam ; Status:   Prescribed ; Ordered As Mnemonic:   meloxicam 15 mg oral tablet ; Simple Display Line:   15 mg, 1 tab(s), PO, Daily, 90 tab(s), 3 Refill(s) ; Ordering Provider:   Jonny Harrell PA-C; Catalog Code:   meloxicam ; Order Dt/Tm:   6/28/2019 4:15:34 PM CDT          traMADol  :   traMADol ; Status:   Prescribed ; Ordered As Mnemonic:   traMADol 50 mg oral tablet ; Simple Display Line:   50 mg, 1 tab(s), PO, q4hr, PRN: for pain, 60 tab(s), 0 Refill(s) ; Ordering Provider:   Jonny Harrell PA-C; Catalog Code:   traMADol ; Order Dt/Tm:   10/2/2019 6:41:31 PM CDT

## 2022-02-16 NOTE — NURSING NOTE
Comprehensive Intake Entered On:  4/17/2020 1:55 PM CDT    Performed On:  4/17/2020 1:52 PM CDT by Kwame Brand CMA               Summary   Chief Complaint :   Verbal consent given for telephone visit.   Pt calling for an ADHD medcheck and refills on his tramadol for his back pain.   Height Measured :   74.75 in(Converted to: 6 ft 3 in, 189.86 cm)    Languages :   English   CathieKwame ramirez CMA - 4/17/2020 1:52 PM CDT   Health Status   Allergies Verified? :   Yes   Medication History Verified? :   Yes   Medical History Verified? :   Yes   Pre-Visit Planning Status :   Not completed   Tobacco Use? :   Current some day smoker   Kwame Brand CMA - 4/17/2020 1:52 PM CDT   Demographics   Last Name :   Lucian   Address :   [address] [place] [address]   First Name :   Kameron   Naila Initial :   CASSIDY   City :   [place]   State :   [place]   Zip Code :   [address]   CathieKwame ramirez CMA - 4/17/2020 1:52 PM CDT   Meds / Allergies   (As Of: 4/17/2020 1:55:42 PM CDT)   Allergies (Active)   iodine  Estimated Onset Date:   Unspecified ; Reactions:   scratchy throat, Swelling ; Created By:   Vonda Shaw; Reaction Status:   Active ; Category:   Drug ; Substance:   iodine ; Type:   Allergy ; Updated By:   Vonda Shaw; Reviewed Date:   4/17/2020 1:55 PM CDT        Medication List   (As Of: 4/17/2020 1:55:42 PM CDT)   Prescription/Discharge Order    amphetamine-dextroamphetamine  :   amphetamine-dextroamphetamine ; Status:   Prescribed ; Ordered As Mnemonic:   Adderall 20 mg oral tablet ; Simple Display Line:   20 mg, 1 tab(s), PO, BID, fill on or after 10/2/2019, 60 tab(s), 0 Refill(s) ; Ordering Provider:   Jonny Harrell PA-C; Catalog Code:   amphetamine-dextroamphetamine ; Order Dt/Tm:   10/2/2019 6:40:44 PM CDT          amphetamine-dextroamphetamine  :   amphetamine-dextroamphetamine ; Status:   Prescribed ; Ordered As Mnemonic:   Adderall 20 mg oral tablet ; Simple Display Line:   20 mg, 1 tab(s), PO, BID, 60 tab(s), 0 Refill(s) ;  Ordering Provider:   Dorothy Beaulieu; Catalog Code:   amphetamine-dextroamphetamine ; Order Dt/Tm:   2/28/2020 1:36:50 PM CST          traMADol  :   traMADol ; Status:   Prescribed ; Ordered As Mnemonic:   traMADol 50 mg oral tablet ; Simple Display Line:   50 mg, 1 tab(s), PO, q4hr, PRN: for pain, 60 tab(s), 0 Refill(s) ; Ordering Provider:   Himanshu Aiken MD; Catalog Code:   traMADol ; Order Dt/Tm:   12/30/2019 8:28:46 AM CST          amphetamine-dextroamphetamine  :   amphetamine-dextroamphetamine ; Status:   Prescribed ; Ordered As Mnemonic:   Adderall 20 mg oral tablet ; Simple Display Line:   20 mg, 1 tab(s), PO, BID, fill on or after 11/1/2019, 60 tab(s), 0 Refill(s) ; Ordering Provider:   Jonny Harrell PA-C; Catalog Code:   amphetamine-dextroamphetamine ; Order Dt/Tm:   10/2/2019 6:41:07 PM CDT          meloxicam  :   meloxicam ; Status:   Prescribed ; Ordered As Mnemonic:   meloxicam 15 mg oral tablet ; Simple Display Line:   15 mg, 1 tab(s), PO, Daily, 90 tab(s), 3 Refill(s) ; Ordering Provider:   Jonny Harrlel PA-C; Catalog Code:   meloxicam ; Order Dt/Tm:   6/28/2019 4:15:34 PM CDT            Social History   Social History   (As Of: 4/17/2020 1:55:42 PM CDT)   Tobacco:  Current       Comments:  4/17/2020 1:54 PM - Kwame Brand CMA: Pt smokes a pack a week.   (Last Updated: 4/17/2020 1:54:25 PM CDT by Kwame Brand CMA)

## 2022-02-16 NOTE — TELEPHONE ENCOUNTER
---------------------  From: Ramon FREED, Bridgette NAVA (Phone Messages Pool ([id number]_[facility]))   To: [facility] Message Pool ([id number]_[place]);     Sent: 1/30/2020 8:02:20 AM CST  Subject: Consumer message: Refill     Medication Refill Approval Required    PCP:   [facility]    Medication:   Adderall 20 mg  Last Filled:  12/30/19    Quantity:  60  Refills:  0    CSA on file?   Yes    Return to Clinic order placed?  NONE    Date of last office visit and reason:   12/30/19, ADHD/Back Pain    Date of last labs pertaining to condition:  _    Note:  Please advise on message requesting refill of med and place RTC.            ---------------------  From: RODNEY FLETCHER  To: [facility]  Sent: 01/30/2020 07:13 a.m. CST  Subject: Refill  If possible, may I please have my refill for my adderall sent in to [facility].  Thanks for your time :-)---------------------  From: Afia Lawson CMA ([facility] Message Pool ([id number]_[facility]))   To: Himanshu Aiken MD;     Sent: 1/30/2020 8:13:21 AM CST  Subject: FW: Consumer message: Refill---------------------  From: Himanshu Aiken MD   To: Afia Lawson CMA;     Sent: 1/30/2020 8:35:22 AM CST  Subject: RE: Consumer message: Refill     Has prescriptions that can be filled on January 28th and February 26th. Call [facility] with [facility] they stated that they have those scripts, they are filling prescription now and will be ready for .      SKIP Lawson CMA---------------------  From: Afia Lawson CMA   To: RODNEY FLETCHER    Sent: 1/30/2020 9:14:12 AM CST  Subject: FW: Consumer message: Refill

## 2022-02-16 NOTE — TELEPHONE ENCOUNTER
---------------------From: Kwame Brand CMA ([facility] Message Pool ([id number]_[facility])) To: Jonny Harrell PA-C;   Sent: 6/8/2020 7:59:17 AM CDTSubject: FW: FW: Selvin Epstein Deployment Information ---------------------From: RODNEY Coronado: [facility] Message Pool ([id number]_[facility])Sent: 06/05/2020 11:12 a.m. CDTSubject: RE: FW: Selvin Epstein Deployment InformationI forgot to include this document in my original message to Dr Harrell. 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 I attached it for him to view as needed so that all corresponding information matches up. Thank you for your time!   Addendum by Kwame Brand CMA on June 04, 2020 8:39:02 AM CDT---------------------From: Kwame Brand CMA ([facility] Message Pool ([address]))To: RODNEY EPSTEIN JSent: 6/4/2020 8:39:02 AM CDTSubject: FW: Selvin Epstein Deployment Information   Addendum by Kwame Brand CMA on June 04, 2020 8:38:57 AM Jonny Rose is out of clinic until 6/8/20 and will not be able to address this until then.  I will forward the message to him Monday.  Thank you Kwame Brand CMA---------------------From: Shilpi Wilson RN (Phone Messages Pool (32224_[facility]))To: [facility] Message Pool (32224_[facility]);Sent: 6/4/2020 8:20:26 AM CDTSubject: FW: Selvin Epstein Deployment Information            ---------------------From: RODNEY Coronado: [facility] HospitalSent: 06/03/2020 07:32 p.m. 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 I am more than capable of deploying without it as I do not use it regularly. The last time I used it was the beginning of February. I would like to discontinue my prescription as tramadol if possible. 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 I hope it all makes sense.Lucian,Please get the ADHD documentation stated below from your primary care provider (This is a direct annotation from our conversation and this information is only to be sent from your provider after their professional consultation - this is simply to help respond to the Waiver and is by no means a professional medical opinion/diagnosis/prognosis):\"Per MOD 15, Tab A, please provide documentation of objective testing and/or clinical evaluation that supports the diagnosis of ADHD. Include the documentation from the initial diagnosis in 2017.\"As far as the Tramadol, chronic and ongoing opioid use is disqualifying for deployment to [facility]. Please get a letter from your doctor to the effect of:The patient, after careful review of the previous  prescription and lack of use of it, can function without the opioid and is to discontinue the medication. The [place] last consumed the prescription on 2020. The [place] is prescribed to take it 3 times a week but has no need to do so, and has not done so. This letter is to confirm stability from the 5th day of  to the present date and the medication is to be discontinued from the patient s medical records as of 2020. The patient is completely able to perform their duty of work scope without this prescription. Able to deploy immediately.Also, please have your provider follow the template as to what additional information is to be provided in the LOS for the Tramodol to specifically include:- Medications: History, dosages, length, frequency of prescription and actual use, and actual need at present time- Course of Diagnosis: Initial course of diagnosis up to present day including response and changes to the diagnosis to include discontinuation of prescription- Prognosis: How the patient will perform and respond in an " austere environment and how a follow-on appointment will not be necessary through the duration of the deployment.- No active thoughts of homicide or suicide---------------------From: Julio Cesar KOLB, Jonny GOODWIN To: [facility] Pool ([id number]);   Sent: 6/8/2020 10:21:11 AM CDTSubject: RE: FW: Selvin Epstein Deployment Information Yet another letter is written

## 2022-02-16 NOTE — TELEPHONE ENCOUNTER
---------------------  From: Mei Pardo RN (Phone Messages Pool ([address] [place] [address]))   To: [facility] Message Pool ([address] [place] [address]);     Sent: 9/13/2021 12:17:37 PM CDT  Subject: Adderall Refill Early       PCP:   [facility]      Time of Call:  1132am        Person Calling:  Pt  Phone number:  [phone]    Note:   Pt calling to see if he would be able to  Adderall today instead of tomorrow due to leaving out of town for work in the morning.   Pt requests that this be sent to [facility] in [place].     Last office visit and reason:  8/16/21 ADHD F/U---------------------  From: Kwame Brand CMA ([facility] Message Pool ([address] [place] [address]))   To: [facility] Provider Pool ([address] [place] [address]);     Sent: 9/13/2021 12:27:58 PM CDT  Subject: FW: Adderall Refill Early     [facility] all week.  I will forward to VANI.  Kwame Brand CMA---------------------  From: Dorothy Beaulieu ([facility] Practice Provider Pool ([address] [place] [address]))   To: [facility] Message Pool ([address] [place] [address]);     Sent: 9/13/2021 12:53:10 PM CDT  Subject: RE: Adderall Refill Early     I resent the rx so he can  today  The [place] Prescription Drug Monitoring Program website queried today, no concernsI called pt and gave him NCB message.  Kwame Brand CMA
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
## 2022-02-16 NOTE — LETTER
(Inserted Image. Unable to display)     Nella 10, 2019      RODNEY FLETCHER  [address]  [place], [place] [id number]          Dear RODNEY,      Thank you for selecting [facility] (previously [facility]) for your healthcare needs.      Our records indicate you are due for the following services:     Follow-up office visit / Medication Check.      To schedule an appointment or if you have further questions, please contact your primary clinic:   [facility]       [phone]   [facility]       [phone]              [facility]     [phone]      Powered by [employer] and [employer]    Sincerely,    Healthcare Providers at [facility]

## 2022-02-16 NOTE — TELEPHONE ENCOUNTER
---------------------  From: Ramon FREED, Bridgette NAVA (Phone Messages Pool ([id number]))   To: [facility] Message Pool ([id number]);     Sent: 12/26/2019 2:26:18 PM CST  Subject: Consumer message: Refill     Medication Refill Approval Required    PCP:   [facility]    Medication:   Adderall  Last Filled:  12/2/19    Quantity:  60  Refills:  0    Medication:   Tramadol  Last Filled:  10/2/19    Quantity:  60  Refills:  0    CSA on file?   Yes, to be seen every 120 days    Return to Clinic order placed?  NONE    Date of last office visit and reason:   10/2/19, ADD/Back pain    Note:  Please see consumer message below with refill requests      ---------------------  From: RODNEY FLETCHER  To: [facility]  Sent: 12/26/2019 02:03 p.m. CST  Subject: Refill  May I please get my refill for my adderall sent to [facility] from dr Aiken. I m due for the adderall fill on the 1st but I leave out of town Sunday for 11 days, is it possible to get it a couple days early to have for when I leave out of town?  Also, can I get a refill on my tramadol as well?---------------------  From: Aifa Lawson CMA ([employer] Message Pool ([id number]))   To: Himanshu Aiken MD;     Sent: 12/26/2019 2:28:35 PM CST  Subject: FW: Consumer message: Refill---------------------  From: Himanshu Aiken MD   To: [employer] Message Pool ([id number]);     Sent: 12/26/2019 2:48:17 PM CST  Subject: RE: Consumer message: Refill     Patient needs to be seen---------------------  From: Afia Lawson CMA ([facility] Message Pool ([id number]))   To: RODNEY FLETCHER    Sent: 12/26/2019 3:03:44 PM CST  Subject: FW: Consumer message: Refill

## 2022-02-16 NOTE — TELEPHONE ENCOUNTER
---------------------  From: Ramon RN, Bridgette NAVA (Phone Messages Pool ([id number]_[facility]))   To: RODNEY FLETCHER    Sent: 12/26/2019 3:26:36 PM CST  Subject: RE: Refill     Adrian Melo,    Dr. Aiken said you need to be seen for refills.    Please make an appointment.    Thank you,    Bridgette Navarro RN      ---------------------  From: RODNEY FLETCHER  To: [facility]  Sent: 12/26/2019 03:21 p.m. CST  Subject: Refill  Can I get my adderall refill from Dr. Aiken sent in, I believe there should be 1 left for January and then I d make an appointment to be seen to restart the 120 day cycle at the end of Jan... I sent 1 request already but was in able to reply to his response on here. I am due for a fill on the 1st but am leaving out of town for about 1 1/2 weeks Sunday so I was wondering if it was possible to get it a day or 2 early to have for when I go out of town...  Thank you
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
## 2022-02-16 NOTE — TELEPHONE ENCOUNTER
After-hours telephone call   Call received: 12:13 PM   Call returned: 12:30 PM   Patient called requesting refill of his Adderall.  He was expecting his last prescription to be transferred from [facility] to [facility].  He is leaving for  drill Monday morning and is requesting a refill today.  He was reminded of our clinic policy for refills of controlled substances.  Given his particular situation a refill is acceptable today.  This is been sent to his pharmacy.

## 2022-02-16 NOTE — PROGRESS NOTES
Patient:   RODNEY FLETCHER            MRN: [id number]            FIN: [id number]               Age:   31 years     Sex:  Male     :  1990   Associated Diagnoses:   Encounter for examination required by Department of Transportation (DOT)   Author:   Jonny Harrell PA-C      Chief Complaint   8/3/2021 3:27 PM CDT     Pt here for a DOT Px.      Subjective   Chief complaint 8/3/2021 3:27 PM CDT     Pt here for a DOT Px. DOT exam.     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  Adderall 20 mg oral tablet: = 1 tab(s) ( 20 mg ), PO, tid, Instructions: fill on or after 7/15/21, # 90 tab(s), 0 Refill(s), Type: Maintenance, Pharmacy: [facility] #[id number], 1 tab(s) Oral tid,x30 day(s),Instr:fill on or after 7/15/21, 74.75, in, 21 7:41:00 CDT, He..., not on any regular medications   Problem list:    All Problems  Obesity / ICD-9-.00 / Probable  Adult ADHD / SNOMED CT 0892374098 / Confirmed  Cigarette Smoker / ICD-9-.1 / Confirmed  ADD (attention deficit disorder) / SNOMED CT 952754664 / Confirmed  Low back pain / SNOMED CT 835328530 / Confirmed  Family History of Diabetes Mellitus (DM) / ICD-9-CM V18.0 / Confirmed  Resolved: Back pain / SNOMED CT 8685183673      Objective         Vital Signs   8/3/2021 3:27 PM CDT Temperature Tympanic 98.4 DegF    Peripheral Pulse Rate 88 bpm    Pulse Site Radial artery    Respiratory Rate 16 br/min    Systolic Blood Pressure 110 mmHg    Diastolic Blood Pressure 80 mmHg    Mean  Arterial Pressure 90 mmHg    BP Site Right arm      Measurements from flowsheet : Measurements   8/3/2021 3:27 PM CDT Height Measured - Standard 74.75 in    Weight Measured - Standard 252 lb    BSA 2.45 m2    Body Mass Index 31.71 kg/m2  HI      General:  No acute distress.    Eye:  Pupils are equal, round and reactive to light, Extraocular movements are intact, visual acuity is normal, horizontal field of vision is 90 degrees bilaterally.    HENT:  Tympanic membranes are clear, Normal hearing, No pharyngeal erythema, No sinus tenderness.    Neck:  Supple, Non-tender, No lymphadenopathy.    Respiratory:  Lungs are clear to auscultation.    Cardiovascular:  Normal rate, Regular rhythm, No murmur.    Gastrointestinal:  Soft, Non-tender, Non-distended, Normal bowel sounds, No organomegaly.    Genitourinary:  testicles smooth symmetrical, without nodules, no rashes or lesions, no hernia present.    Musculoskeletal:  Normal range of motion.    Neurologic:  Cranial Nerves II-XII are grossly intact, Normal deep tendon reflexes, Romberg is negative.    Psychiatric:  Appropriate mood & affect.       Results Review   Results review   Urinalysis is normal      Impression and Plan   Assessment and Plan:          Diagnosis: Encounter for examination required by Department of Transportation (DOT) (WAP96-TO Z02.89).    Orders     Orders   Charges:  9939D DOT Exam (Charge) (Order): Quantity: 1, Encounter for examination required by Department of Transportation (DOT).     Normal DOT exam; Approved for 2 years without restrictions.     .

## 2022-02-16 NOTE — PROGRESS NOTES
Patient:   RODNEY FLETCHER            MRN: [id number]            FIN: [id number]               Age:   28 years     Sex:  Male     :  1990   Associated Diagnoses:   ADD (attention deficit disorder); Back pain; Open avulsion fracture of distal phalanx of finger; Subungual hematoma of finger   Author:   Amaya Donohue      Visit Information      Primary Care Provider (PCP):  Amaya Donohue    NPI# [id number]      Chief Complaint   2018 6:44 PM CST   f/u ADD. Discuss meloxicam and tramadol.        History of Present Illness   PPC for three different reasons  1.  needs adderall refilled, likes current dose, but finds out if wears off after 6 hours, taking it at 6am and 4pm.  pdmp shows last dispense 10/25/18  weight is down 17lbs since 2018, BP mildly elevated today    2.  subungal hematoma opened and drained on 18, also has avulsion fracture of distal phalynx right ring finger, after he went home on 18 he attempted to drain this nail again using larger sewing needle, denies redness or pain at site, keeping finger in splint, reports tetanus up to date 2018    3.  has known lower back pain, would like refill of meloxicam which he uses daily prn, tramadol was not beneficial for him         Review of Systems   Constitutional:  Negative.    Ear/Nose/Mouth/Throat:  Negative.    Respiratory:  Negative.    Cardiovascular:  Negative.    Gastrointestinal:  Negative.    Hematology/Lymphatics:  Negative.    Immunologic:  Negative.    Musculoskeletal:  Negative except as documented in history of present illness.    Integumentary:  Negative except as documented in history of present illness.    Neurologic:  Negative.    Psychiatric:  No anxiety, No depression, No elina, Not suicidal, Not delusional, No hallucinations.       Health Status   Allergies:    Allergic Reactions (Selected)  Severity Not Documented  Iodine (Scratchy throat and swelling)   Medications:  (Selected)    Prescriptions  Prescribed  Adderall 20 mg oral tablet: = 1 tab(s) ( 20 mg ), PO, BID, # 60 tab(s), 0 Refill(s), Type: Maintenance, Pharmacy: [facility] #[id number], 1 tab(s) Oral bid  Adderall 20 mg oral tablet: = 1 tab(s) ( 20 mg ), PO, BID, # 60 tab(s), 0 Refill(s), Type: Maintenance, Pharmacy: [facility] #[id number], okay to fill 12/24/18, 1 tab(s) Oral bid  Augmentin 875 mg oral tablet: 1 tab(s), PO, q12hr, # 20 tab(s), 0 Refill(s), Type: Maintenance, Pharmacy: [facility] #[id number], 1 tab(s) Oral q12 hrs,x10 day(s)  meloxicam 15 mg oral tablet: = 1 tab(s) ( 15 mg ), PO, Daily, # 90 tab(s), 1 Refill(s), Type: Maintenance, Pharmacy: [facility] #[id number], 1 tab(s) Oral daily  traMADol 50 mg oral tablet: 1 tab(s) ( 50 mg ), PO, q4hr, PRN: for pain, # 30 tab(s), 0 Refill(s), Type: Maintenance, Pharmacy: [facility] #[id number], 1 tab(s) po q4 hrs,PRN:for pain   Problem list:    All Problems (Selected)  ADD (attention deficit disorder) / SNOMED CT 656716599 / Confirmed  Cigarette Smoker / ICD-9-.1 / Confirmed  Family History of Diabetes Mellitus (DM) / ICD-9-CM V18.0 / Confirmed  Low back pain / SNOMED CT 248937591 / Confirmed  Obesity / ICD-9-.00 / Probable      Histories   Past Medical History:    Resolved  Back pain ([id number]):  Resolved.   Family History:    Diabetes mellitus  Sister  Brother  Diabetes mellitus - adult onset  Mother  Father     Social History:        Tobacco Assessment: Current            Current, Cigarettes, Snuff        Physical Examination   Vital Signs   11/26/2018 6:44 PM CST Peripheral Pulse Rate 88 bpm    Pulse Site Radial artery    HR Method Manual    Systolic Blood Pressure 138 mmHg  HI    Diastolic Blood Pressure 84 mmHg  HI    Mean Arterial Pressure 102 mmHg    BP Site Right arm    BP Method Manual      General:  Alert and oriented, No acute distress.    Eye:  Pupils are equal, round and reactive to light.    HENT:  Normocephalic.    Respiratory:  Respirations are  non-labored.    Cardiovascular:  Regular rhythm, No edema.    Musculoskeletal:  Normal range of motion, Normal gait, right ring finger in foam caged finger splint.    Integumentary:  Warm, Dry, Pink.    Neurologic:  Alert, Oriented, Normal sensory, No focal deficits.    Psychiatric:  Cooperative, Appropriate mood & affect, Normal judgment.       Impression and Plan   Diagnosis     ADD (attention deficit disorder) (FRH59-KX F98.8).     Back pain (XJP55-DK M54.9).     Open avulsion fracture of distal phalanx of finger (RHR68-LL S62.639B).     Subungual hematoma of finger (DZK79-CX S60.10XA).     Patient Instructions:       Counseled: Patient, Regarding diagnosis, Regarding treatment, Regarding medications, Activity, Verbalized understanding.    Summary:  ADD; refilled adderall, will need to monitor BP, last reading was normal, advised him to take medication at 6am and 1pm, it is not mean to be divided by 8-10hours  discussed length of medication effect, follow up in 3 months, continue to monitor weight loss    open avulsion of distal phalynx: with draining the subungal hematoma there is infection risk, with pt using a non sterile needle to further drain trapped blood risk has risen, placing pt on augmentin  discussed potential side effects of medication    back pain: this is a chronic issue for Selvin, refill meloxicam, not refilling tramadol as it was not beneficial  .    Orders     Orders (Selected)   Prescriptions  Prescribed  Adderall 20 mg oral tablet: = 1 tab(s) ( 20 mg ), PO, BID, # 60 tab(s), 0 Refill(s), Type: Maintenance, Pharmacy: [facility] #[id number], 1 tab(s) Oral bid  Adderall 20 mg oral tablet: = 1 tab(s) ( 20 mg ), PO, BID, # 60 tab(s), 0 Refill(s), Type: Maintenance, Pharmacy: [facility] #[id number], okay to fill 12/24/18, 1 tab(s) Oral bid  Augmentin 875 mg oral tablet: 1 tab(s), PO, q12hr, # 20 tab(s), 0 Refill(s), Type: Maintenance, Pharmacy: [id number], 1 tab(s) Oral q12 hrs,x10  day(s)  meloxicam 15 mg oral tablet: = 1 tab(s) ( 15 mg ), PO, Daily, # 90 tab(s), 1 Refill(s), Type: Maintenance, Pharmacy: [facility] #[id number], 1 tab(s) Oral daily.
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
## 2022-02-16 NOTE — CARE COORDINATION
[place]  [facility] [place] out of parameters for elevated BP.  PT was rechecked 07/05/19: 120/78    Stella Frazier CMA.
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
## 2022-02-16 NOTE — PROGRESS NOTES
Patient:   RODNEY FLETCHER            MRN: [id number]            FIN: [id number]               Age:   28 years     Sex:  Male     :  1990   Associated Diagnoses:   Adult ADHD   Author:   Jonny Harrell PA-C      Chief Complaint   2018 8:24 AM CDT    Pt here for an ADHD medcheck and refills        History of Present Illness             The patient presents with Presents for follow up of attention deficit disorder.  There have been no problems with the medication.  There are no other current concerns..                Review of Systems   Constitutional:  Negative.    Cardiovascular:  Negative.    Gastrointestinal:  Negative.    Musculoskeletal:  Negative.    Neurologic:  Negative.    Psychiatric:  No anxiety, No depression.       Health Status   Allergies:    Allergic Reactions (Selected)  Severity Not Documented  Iodine (Scratchy throat and swelling)   Problem list:    All Problems  Obesity / ICD-9-.00 / Probable  Cigarette Smoker / ICD-9-.1 / Confirmed  ADD (attention deficit disorder) / SNOMED CT 212416402 / Confirmed  Low back pain / SNOMED CT 289047650 / Confirmed  Family History of Diabetes Mellitus (DM) / ICD-9-CM V18.0 / Confirmed  Resolved: Back pain / SNOMED CT 3994169544   Medications:  (Selected)   Prescriptions  Prescribed  Adderall 20 mg oral tablet: 1 tab(s) ( 20 mg ), PO, BID, # 60 tab(s), 0 Refill(s), Type: Maintenance, Pharmacy: [id number], [place] to fill 18 ( has drill next week), 1 tab(s) po bid  Adderall 20 mg oral tablet: 1 tab(s) ( 20 mg ), PO, BID, # 60 tab(s), 0 Refill(s), Type: Maintenance, Pharmacy: [facility] #[id number], [place] to [place] 18, 1 tab(s) po bid  meloxicam 15 mg oral tablet: 1 tab(s) ( 15 mg ), PO, Daily, # 90 tab(s), 1 Refill(s), Type: Maintenance, Pharm[facility]: [id number], 1 tab(s) po daily  traMADol 50 mg oral tablet: 1 tab(s) ( 50 mg ), PO, q4hr, PRN: for pain, # 30 tab(s), 0 Refill(s), Type: Maintenance, Pharmacy: [facility] #[id number], 1 tab(s) po q4 hrs,PRN:for pain      Histories   Past Medical History:    Resolved  Back pain ([id number]):  Resolved.   Family History:    Diabetes mellitus  Sister  Brother  Diabetes mellitus - adult onset  Mother  Father     Procedure history:    No active procedure history items have been selected or recorded.   Social History:        Tobacco Assessment: Current            Current, Cigarettes, Snuff        Physical Examination   Vital Signs   8/31/2018 8:24 AM CDT Temperature Tympanic 97.3 DegF  LOW    Peripheral Pulse Rate 80 bpm    Pulse Site Radial artery    Respiratory Rate 16 br/min    Systolic Blood Pressure 122 mmHg    Diastolic Blood Pressure 78 mmHg    Mean Arterial Pressure 93 mmHg    BP Site Right arm      Measurements from flowsheet : Measurements   8/31/2018 8:24 AM CDT Height Measured - Standard 74.75 in    Weight Measured - Standard 263 lb    BSA 2.51 m2    Body Mass Index 33.09 kg/m2  HI      General:  No acute distress.    Respiratory:  Lungs are clear to auscultation.    Cardiovascular:  Normal rate, Regular rhythm, No murmur.    Psychiatric:  Cooperative, Appropriate mood & affect.       Impression and Plan   Diagnosis     Adult ADHD (USA82-GZ F90.0).     Course:  Progressing as expected, Well controlled.    Plan:  continue current medication, given Rx for 2 months, will call for the 3rd  month, will be seen every 90 days, [place] PDMP consulted, no irregularities noted  .    Orders     Orders   Pharmacy:  Adderall 20 mg oral tablet (Prescribe): = 1 tab(s) ( 20 mg ), PO, BID, Instructions: fill on or after 9/26/2018, # 60 tab(s), 0 Refill(s), Type: Maintenance, Pharmacy: [facility] #[id number], [place] to fill 7/20/18, 1 tab(s) Oral bid,Instr:fill on or after 9/26/2018  Adderall 20 mg oral tablet (Prescribe): = 1 tab(s) ( 20 mg ), PO, BID, Instructions: fill on or after 8/31/2018, # 60 tab(s), 0 Refill(s), Type: Maintenance, Pharmacy: [facility] #[id number], [place] to [place] 6/22/18 (  has drill next week), 1 tab(s) Oral bid,Instr:fill on or after 8/31/2018  Adderall 20 mg oral tablet (Modify): = 1 tab(s) ( 20 mg ), PO, BID, # 60 tab(s), 0 Refill(s), Type: Hard Stop, Pharmacy: [facility] #[id number], [place] to [place] 6/22/18 ( has drill next week)  Adderall 20 mg oral tablet (Modify): = 1 tab(s) ( 20 mg ), PO, BID, # 60 tab(s), 0 Refill(s), Type: Hard Stop, Pharmacy: [facility] #[id number], [place] to [place] 7/20/18.     Orders   Charges (Evaluation and Management):  15484 office outpatient visit 15 minutes (Charge) (Order): Quantity: 1, Adult ADHD.     Summary

## 2022-02-16 NOTE — PROGRESS NOTES
Patient:   RODNEY FLETCHER            MRN: [id number]            FIN: [id number]               Age:   30 years     Sex:  Male     :  1990   Associated Diagnoses:   Adult ADHD   Author:   Jonny Harrell PA-C      Chief Complaint   2021 7:41 AM CDT    Pt here for an ADHD medcheck.      History of Present Illness             The patient presents with Presents for follow up of attention deficit disorder.  There have been no problems with the medication.  There are no other current concerns..     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  During this deployment his Adderall was increased to tid dosing and he finds that dosing schedule works well for him, he did try the XR version but did not   like the way that made him feel and the results were not consistent  he has a hx of low back pain but it was not a problem during the deployment, he was doing regular core exercises             Review of Systems   Constitutional:  Negative.    Cardiovascular:  Negative.    Gastrointestinal:  Negative.    Musculoskeletal:  Negative.    Neurologic:  Negative.    Psychiatric:  No anxiety, No depression.       Health Status   Allergies:    Allergic Reactions (Selected)  Severity Not Documented  Iodine (Scratchy throat and swelling)   Problem list:    All Problems  Obesity / ICD-9-.00 / Probable  Adult ADHD / SNOMED CT 3181162275 / Confirmed  Cigarette Smoker / ICD-9-.1 / Confirmed  ADD (attention deficit disorder) / SNOMED CT 978094811 / Confirmed  Low back pain / SNOMED CT 408182628 / Confirmed  Family History of Diabetes Mellitus (DM) / ICD-9-CM V18.0 / Confirmed  Resolved: Back pain / SNOMED CT 2957181376   Medications:  (Selected)   Prescriptions  Prescribed  Adderall 20 mg oral tablet: = 1 tab(s) ( 20 mg ), PO, BID, Instructions: fill on or afte 5/15/2020, # 60 tab(s), 0 Refill(s), Type: Maintenance, Pharmacy: [facility] #[id number], 1 tab(s) Oral bid,Instr:fill on or afte  5/15/2020  Adderall 20 mg oral tablet: = 1 tab(s) ( 20 mg ), PO, BID, Instructions: fill on or after 4/17/2020, # 60 tab(s), 0 Refill(s), Type: Maintenance, Pharmacy: [facility] #[id number], 1 tab(s) Oral bid,Instr:fill on or after 4/17/2020      Histories   Past Medical History:    Resolved  Back pain ([id number]):  Resolved.   Family History:    Diabetes mellitus  Sister  Brother  Diabetes mellitus - adult onset  Mother  Father     Procedure history:    No active procedure history items have been selected or recorded.   Social History:        Electronic Cigarette/Vaping Assessment            Electronic Cigarette Use: Never.      Tobacco Assessment: Current            4 or less cigarettes(less than 1/4 pack)/day in last 30 days                     Comments:                      04/17/2020 - Cathie REBOLLEDO, Kwame                     Pt smokes a pack a week.        Physical Examination   Vital Signs   4/20/2021 7:41 AM CDT Temperature Tympanic 96.3 DegF  LOW    Peripheral Pulse Rate 80 bpm    Pulse Site Radial artery    Respiratory Rate 16 br/min    Systolic Blood Pressure 112 mmHg    Diastolic Blood Pressure 78 mmHg    Mean Arterial Pressure 89 mmHg    BP Site Right arm      Measurements from flowsheet : Measurements   4/20/2021 7:41 AM CDT Height Measured - Standard 74.75 in    Weight Measured - Standard 249 lb    BSA 2.44 m2    Body Mass Index 31.33 kg/m2  HI      General:  No acute distress.    Respiratory:  Lungs are clear to auscultation.    Cardiovascular:  Normal rate, Regular rhythm, No murmur.    Psychiatric:  Cooperative, Appropriate mood & affect.       Impression and Plan   Diagnosis     Adult ADHD (VWK60-RC F90.0).     Course:  Progressing as expected, Well controlled.    Plan:  continue current medication, given Rx for 2 months, will call for the 3rd and 4th months, will be seen every 120 days, will increase his dosing to tid , [place] PDMP consulted, no irregularities noted  .    Orders     Orders    Pharmacy:  Adderall 20 mg oral tablet (Prescribe): = 1 tab(s) ( 20 mg ), PO, tid, x 30 day(s), Instructions: fill on or after 5/18/21, # 90 tab(s), 0 Refill(s), Type: Maintenance, Pharmacy: [facility] #[id number], 1 tab(s) Oral tid,x30 day(s),Instr:fill on or after 5/18/21, 74.75, in, 4/20/2021 7:41 AM CDT, Height Measured, 249, lb, 4/20/2021 7:41 AM CDT, Weight Measured  Adderall 20 mg oral tablet (Prescribe): = 1 tab(s) ( 20 mg ), PO, tid, x 30 day(s), Instructions: fill on or afte 4/20/21, # 90 tab(s), 0 Refill(s), Type: Maintenance, Pharmacy: [facility] #[id number], 1 tab(s) Oral tid,x30 day(s),Instr:fill on or afte 4/20/21, 74.75, in, 4/20/2021 7:41 AM CDT, Height Measured, 249, lb, 4/20/2021 7:41 AM CDT, Weight Measured  Adderall 20 mg oral tablet (Modify): = 1 tab(s) ( 20 mg ), PO, BID, Instructions: fill on or afte 5/15/2020, # 60 tab(s), 0 Refill(s), Type: Hard Stop, Pharmacy: [facility] #[id number]  Adderall 20 mg oral tablet (Modify): = 1 tab(s) ( 20 mg ), PO, BID, Instructions: fill on or after 4/17/2020, # 60 tab(s), 0 Refill(s), Type: Hard Stop, Pharmacy: [facility] #[id number].     Orders   Charges (Evaluation and Management):  08693 office o/p est low 20-29 min (Charge) (Order): Quantity: 1, Adult ADHD.     Summary:  I reviewed with the pt the controlled substances contract.  We reviewed the risks and benefits of the medication, the need for close follow  up and appointment frequency during the initial phase of treatment, then the option of reducing visit frequency and how to call in for refills appropriately in advance on the off-visit months.  Counseled that if the prescription is lost, a new Rx will not be issued, because of the potential for abuse with this medication.  Counseled that the same pharmacy and the same provider need to be utilized for refills. Pt expresses understanding and signed the agreement .      .

## 2022-02-16 NOTE — TELEPHONE ENCOUNTER
---------------------  From: Nicolasa Jc CMA (Phone Messages Pool ([address] [place] [address]))   To: [facility] Message Pool ([address] [place] [address]);     Sent: 12/2/2019 1:24:06 PM CST  Subject: FW: Refill           ---------------------  From: RODNEY FLETCHER  To: [facility]  Sent: 12/02/2019 01:16 p.m. CST  Subject: Refill  May I please get my refill for my adderall sent to [facility] from dr Aiken  Thank you---------------------  From: Afia Lawson CMA ([facility] Message Pool ([address] [place] [address]))   To: [facility] Message Pool ([address] [place] [address]);     Sent: 12/2/2019 1:30:16 PM CST  Subject: FW: Refill---------------------  From: Kwame Brand CMA ([facility] Message Pool ([address] [place] [address]))   To: Jonny Harrell PA-C;     Sent: 12/2/2019 1:33:54 PM CST  Subject: FW: Refill---------------------  From: Jonny Harrell PA-C   To: [facility] Message Pool ([address] [place] [address]);     Sent: 12/2/2019 1:41:16 PM CST  Subject: RE: Refill     New Rx sent in, [facility] consulted, no irregularities notedChJonny robins sent in refills to your pharmacy.  Have a nice day.  Kwame Brand CMA---------------------  From: Kwame Brand CMA ([facility] Message Pool ([address] [place] [address]))   To: RODNEY FLETCHER    Sent: 12/2/2019 1:53:04 PM CST  Subject: FW: Refill

## 2022-02-16 NOTE — TELEPHONE ENCOUNTER
Entered by Kwame Brand CMA on June 08, 2020 12:41:47 PM CDT  Jesús Montalvo was able to speak with Kwame Krishnamurthy per your request.  I also faxed the new letter with requested documentation to [phone] and confirmation was recieved.  Again, I think the telephone number and fax number have been switched around because when I faxed to [phone] the fax doesn't go through and I cant get ahold of anybody when I try and call the telephone number.  Please send a message if anastasiia new information has not been received.  Kwame Brand CMA      ---------------------  From: RODNEY EPSTEIN  To: [employer] Message Pool ([address])  Sent: 06/08/2020 11:07 a.m. CDT  Subject: RE: FW: Selvin Epstein Deployment Information  [phone]  That s the contact for Yessy    If he can call ASAP cause we have a limited window for calls [place] on [place] due to service and amenities to recieve calls. If the call doesn t get answered please leave a call back number and it will be returned right away. Do you know when he will be available to call?    Thank you       Addendum by Kwame Brand CMA on June 08, 2020 9:36:38 AM CDT  ---------------------  From: Kwame Brand CMA ([employer] Pool ([address]))  To: RODNEY EPSTEIN  Sent: 6/8/2020 9:36:38 AM CDT  Subject: FW: Selvin Epstein Deployment Information       Addendum by Kwame Brand CMA on June 08, 2020 9:36:32 AM CDT  Jesús Montalvo got your message but is having some trouble with what exactly you need done.  Is there a phone number of a medical person he could call and discuss this with where you are at?  Kwame Brand CMA       Addendum by Kwame Brand CMA on June 04, 2020 8:39:02 AM CDT  ---------------------  From: Kwame Brand CMA ([facility] Message Pool ([address]))  To: RODNEY EPSTEIN  Sent: 6/4/2020 8:39:02 AM CDT  Subject: FW: Selvin Epstein Deployment Information       Addendum by Kwame Brand CMA on June 04, 2020 8:38:57 AM CDT  Selvin  "Jonny Hoytg is out of clinic until 20 and will not be able to address this until then.  I will forward the message to him Monday.  Thank you Kwame Brand [facility]  ---------------------  From: Shilpi Wilson RN (Phone Messages Pool ([address]))  To: [facility] Message Pool ([address]);  Sent: 2020 8:20:26 AM CDT  Subject: FW: Selvin Epstein Deployment Information                      ---------------------  From: RODNEY EPSTEIN  To: [facility]  Sent: 2020 07:32 p.m. 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 I am more than capable of deploying without it as I do not use it regularly. The last time I used it was the beginning of February. I would like to discontinue my prescription as tramadol if possible. 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 I hope it all makes sense.  Lucian,  Please get the ADHD documentation stated below from your primary care provider (This is a direct annotation from our conversation and this information is only to be sent from your provider after their professional consultation - this is simply to help respond to the Waiver and is by no means a professional medical opinion/diagnosis/prognosis):  \"Per MOD 15, Tab A, please provide documentation of objective testing and/or clinical evaluation that supports the diagnosis of ADHD. Include the documentation from the initial diagnosis in 2017.\"  As far as the Tramadol, chronic and ongoing opioid use is disqualifying for deployment to [facility]. Please get a letter from your doctor to the effect of:  The patient, after careful review of the previous  prescription and lack of use of it, can function without the " opioid and is to discontinue the medication. The [place] last consumed the prescription on February 5th, 2020. The [place] is prescribed to take it 3 times a week but has no need to do so, and has not done so. This letter is to confirm stability from the 5th day of February, 2020 to the present date and the medication is to be discontinued from the patient s medical records as of 02/05/2020. The patient is completely able to perform their duty of work scope without this prescription. Able to deploy immediately.  Also, please have your provider follow the template as to what additional information is to be provided in the LOS for the Tramodol to specifically include:  - Medications: History, dosages, length, frequency of prescription and actual use, and actual need at present time  - Course of Diagnosis: Initial course of diagnosis up to present day including response and changes to the diagnosis to include discontinuation of prescription  - Prognosis: How the patient will perform and respond in an austere environment and how a follow-on appointment will not be necessary through the duration of the deployment.  - No active thoughts of homicide or suicide---------------------  From: Kwame Brand CMA ([employer] Pool ([address]))   To: RODNEY FLETCHER    Sent: 6/8/2020 12:41:52 PM CDT  Subject: FW: FW: Selvin Fletcher Deployment Information

## 2022-02-16 NOTE — NURSING NOTE
Comprehensive Intake Entered On:  7/5/2019 11:04 AM CDT    Performed On:  7/5/2019 10:38 AM CDT by Rochelle Patel               Summary   Chief Complaint :   Patient presents for DOT physical   Weight Measured :   260.4 lb(Converted to: 260 lb 6 oz, 118.12 kg)    Height Measured :   74.75 in(Converted to: 6 ft 3 in, 189.86 cm)    Body Mass Index :   32.76 kg/m2 (HI)    Body Surface Area :   2.49 m2   Systolic Blood Pressure :   120 mmHg   Diastolic Blood Pressure :   78 mmHg   Mean Arterial Pressure :   92 mmHg   Peripheral Pulse Rate :   84 bpm   BP Site :   Right arm   Pulse Site :   Radial artery   BP Method :   Manual   HR Method :   Manual   Temperature Tympanic :   97.4 DegF(Converted to: 36.3 DegC)  (LOW)    Languages :   English   Rochelle Patel - 7/5/2019 10:38 AM CDT   Health Status   Allergies Verified? :   Yes   Medication History Verified? :   Yes   Medical History Verified? :   Yes   Pre-Visit Planning Status :   Completed   Tobacco Use? :   Current every day smoker   Rochelle Patel - 7/5/2019 10:38 AM CDT   Consents   Consent for Immunization Exchange :   Consent Granted   Consent for Immunizations to Providers :   Consent Granted   Rochelle Patel - 7/5/2019 10:38 AM CDT   Meds / Allergies   (As Of: 7/5/2019 11:04:01 AM CDT)   Allergies (Active)   iodine  Estimated Onset Date:   Unspecified ; Reactions:   scratchy throat, Swelling ; Created By:   Vonda Shaw; Reaction Status:   Active ; Category:   Drug ; Substance:   iodine ; Type:   Allergy ; Updated By:   Vonda Shaw; Reviewed Date:   7/5/2019 10:58 AM CDT        Medication List   (As Of: 7/5/2019 11:04:01 AM CDT)   Prescription/Discharge Order    traMADol  :   traMADol ; Status:   Prescribed ; Ordered As Mnemonic:   traMADol 50 mg oral tablet ; Simple Display Line:   50 mg, 1 tab(s), PO, q4hr, PRN: for pain, 30 tab(s), 0 Refill(s) ; Ordering Provider:   Jonny Harrell PA-C; Catalog Code:   traMADol ; Order Dt/Tm:    6/28/2019 4:16:15 PM          meloxicam  :   meloxicam ; Status:   Prescribed ; Ordered As Mnemonic:   meloxicam 15 mg oral tablet ; Simple Display Line:   15 mg, 1 tab(s), PO, Daily, 90 tab(s), 3 Refill(s) ; Ordering Provider:   Jonny Harrell PA-C; Catalog Code:   meloxicam ; Order Dt/Tm:   6/28/2019 4:15:34 PM          amphetamine-dextroamphetamine  :   amphetamine-dextroamphetamine ; Status:   Prescribed ; Ordered As Mnemonic:   Adderall 20 mg oral tablet ; Simple Display Line:   20 mg, 1 tab(s), PO, BID, fill on or after 7/25/2019, 60 tab(s), 0 Refill(s) ; Ordering Provider:   Jonny Harrell PA-C; Catalog Code:   amphetamine-dextroamphetamine ; Order Dt/Tm:   6/28/2019 4:15:09 PM          amphetamine-dextroamphetamine  :   amphetamine-dextroamphetamine ; Status:   Prescribed ; Ordered As Mnemonic:   Adderall 20 mg oral tablet ; Simple Display Line:   20 mg, 1 tab(s), PO, BID, fill on or after 6/28/2019, 60 tab(s), 0 Refill(s) ; Ordering Provider:   Jonny Harrell PA-C; Catalog Code:   amphetamine-dextroamphetamine ; Order Dt/Tm:   6/28/2019 4:14:35 PM            Vision Testing POC   Eye, Left Visual Acuity :   20/15   Eye, Right Visual Acuity :   20/20   Eye, Bilateral Visual Acuity :   20/15   Rochelle Patel - 7/5/2019 10:38 AM T

## 2022-02-16 NOTE — PROGRESS NOTES
Patient:   RODNEY FLETCHER            MRN: [id number]            FIN: [id number]               Age:   31 years     Sex:  Male     :  1990   Associated Diagnoses:   Adult ADHD   Author:   Jonny Harrell PA-C      Visit Information      Date of Service: 2021 06:32 am  Performing Location: [facility]  Encounter#: [id number]      Primary Care Provider (PCP):  Jonny Harrell PA-C    NPI# [id number]   Visit type:  Telephone Encounter.    Source of history:  Patient.    Location of patient:  _home  Call Start Time:   _1236  Call End Time:    _1240      Chief Complaint   2021 12:11 PM CST   Verbal consent given for a telephone visit.  Pt is calling for refills on his ADHD medications.     _      History of Present Illness   Today's visit was conducted via telephone due to the COVID-19 pandemic. Patient's consent to telephone visit was obtained and documented.      Reason for visit:  _ Chief complaint and symptoms noted above and confirmed with patient    21:      The patient presents with Presents for follow up of attention deficit disorder.  There have been no problems with the medication.  There are no other current concerns..     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  During this deployment his Adderall was increased to tid dosing and he finds that dosing schedule works well for him, he did try the XR version but did not   like the way that made him feel and the results were not consistent  he has a hx of low back pain but it was not a problem during the deployment, he was doing regular core exercises    21:  visit today for refills of Adderall, doing well on the tid dosing  he is living down in [place] now and would his Rx sent there, he is not coming up to [place] very much any more    2021:  visit today for refills on his Adderall, doing well on that, sleeping well         Review of Systems   Constitutional:  Negative.     Ear/Nose/Mouth/Throat:  Negative.    Respiratory:  Negative.    Cardiovascular:  Negative.    Gastrointestinal:  Negative.       Impression and Plan   Diagnosis     Adult ADHD (CTI64-NW F90.0).     Course:  Good response to treatment.    Summary:  Rxs given for 2 months, will call for 3rd and 4th months, recheck in 4 months, [facility] consulted, no irregularities noted  .    Orders     Orders   Pharmacy:  Adderall 20 mg oral tablet (Prescribe): = 1 tab(s) ( 20 mg ), PO, tid, x 30 day(s), Instructions: fill on or after 12/31/2021, # 90 tab(s), 0 Refill(s), Type: Maintenance, Pharmacy: [facility] #[id number], 1 tab(s) Oral tid,x30 day(s),Instr:fill on or after 12/31/2021, 74.75, in, 12/2/2021 12:11 PM CST, Height Measured, 252, lb, 8/3/2021 3:27 PM CDT, Weight Measured  Adderall 20 mg oral tablet (Prescribe): = 1 tab(s) ( 20 mg ), PO, tid, x 30 day(s), Instructions: fill on or after 12/2/21, # 90 tab(s), 0 Refill(s), Type: Hard Stop, Pharmacy: [facility] #[id number], 1 tab(s) Oral tid,x30 day(s),Instr:fill on or after 12/2/21, 74.75, in, 12/2/2021 12:11 PM CST, Height Measured, 252, lb, 8/3/2021 3:27 PM CDT, Weight Measured  Adderall 20 mg oral tablet (Modify): = 1 tab(s) ( 20 mg ), PO, tid, x 30 day(s), Instructions: fill on or after 11/8/21, # 90 tab(s), 0 Refill(s), Type: Hard Stop, Pharmacy: [facility] #[id number], 74.75, in, 08/16/21 11:00:00 CDT, Height Measured, 252, lb, 08/03/21 15:27:00 CDT, Weight Measured.     Orders   Charges (Evaluation and Management):  59689 office o/p est low 20-29 min (Charge) (Order): Quantity: 1, Adult ADHD.        Health Status   Allergies:    Allergic Reactions (Selected)  Severity Not Documented  Iodine (Scratchy throat and swelling)   Medications:  (Selected)   Prescriptions  Prescribed  Adderall 20 mg oral tablet: = 1 tab(s) ( 20 mg ), PO, tid, Instructions: fill on or afte 8/16/21, # 90 tab(s), 0 Refill(s), Type: Maintenance, Pharmacy: [facility] #[id number], 1 tab(s) Oral tid,x30 day(s),Instr:fill on or afte 8/16/21, 74.75, in, 08/16/21 11:00:00 CDT, Hei...  Adderall 20 mg oral tablet: = 1 tab(s) ( 20 mg ), PO, tid, Instructions: fill on or after 11/8/21, # 90 tab(s), 0 Refill(s), Type: Maintenance, Pharmacy: [facility] #[id number], 1 tab(s) Oral tid,x30 day(s),Instr:fill on or after 11/8/21, 74.75, in, 08/16/21 11:00:00 CDT, H...   Problem list:    All Problems  Obesity / ICD-9-.00 / Probable  Adult ADHD / SNOMED CT 4004775410 / Confirmed  Cigarette Smoker / ICD-9-.1 / Confirmed  ADD (attention deficit disorder) / SNOMED CT 886264334 / Confirmed  Low back pain / SNOMED CT 745558822 / Confirmed  Family History of Diabetes Mellitus (DM) / ICD-9-CM V18.0 / Confirmed      Histories   Past Medical History:    Resolved  Back pain (1836212433):  Resolved.   Family History:    Diabetes mellitus  Sister  Brother  Diabetes mellitus - adult onset  Mother  Father     Procedure history:    No active procedure history items have been selected or recorded.   Social History:        Electronic Cigarette/Vaping Assessment            Electronic Cigarette Use: Never.      Tobacco Assessment: Current            4 or less cigarettes(less than 1/4 pack)/day in last 30 days                     Comments:                      04/17/2020 - Kwame Brand CMA                     Pt smokes a pack a week.        Physical Examination   Measurements from flowsheet : Measurements   12/2/2021 12:11 PM CST   Height Measured - Standard                74.75 in

## 2022-02-16 NOTE — TELEPHONE ENCOUNTER
---------------------  From: Rochelle Patel (Phone Messages Pool (32224_[facility]))   To: [facility] Message Pool (32224_[place]);     Sent: 8/26/2019 12:16:48 PM CDT  Subject: Aderall     Phone Message    PCP: Rebecca; Asked for [facility]    Time of Call: 1200    Phone Number: [phone]    Note: Patient calling wondering if [facility] would fill his Adderall.     Please Advise. Looks like pt has not seen [facility].     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    Transferred to: DERECK---------------------  From: Afia Lawson CMA (GJM Message Pool (32224_[facility]))   To: Himanshu Aiken MD;     Sent: 8/26/2019 1:22:32 PM CDT  Subject: FW: Aderoman---------------------  From: Himanshu Aiken MD   To: Jonny Harrell PA-C;     Cc: Rochelle Patel; Afia Lawson CMA;      Sent: 8/26/2019 1:39:30 PM CDT  Subject: FW: Claudia Espinosa  Wondering if he meant to ask for you after reviewing the chart  Lai---------------------  From: Jonny Harrell PA-C   To: Dorothy Beaulieu;     Sent: 8/26/2019 2:53:29 PM CDT  Subject: FW: Claudia De La Cruz,    Can you please fill this Rx?  #60, no refills.  I did check [place] PDMP and it's fine.  I still can't get [employer] to work for me (but Terrie is working on it).  Thanks,  Amy sentPatient notified.

## 2022-02-16 NOTE — TELEPHONE ENCOUNTER
Entered by Kwame Brand CMA on June 08, 2020 10:38:50 AM CDT  Selvin Espinosa sent this name and number if you needed to discuss this letter any further.  Kwame Brand CMA      ---------------------  From: RODNEY EPSTEIN  To: [employer] Message Pool ([id number]_[facility])  Sent: 06/08/2020 10:19 a.m. CDT  Subject: RE: FW: Selvin Epstein Deployment Information  [phone]  Kwame Mendezty       Addendum by Kwame Brand CMA on June 08, 2020 9:36:38 AM CDT  ---------------------  From: Kwame Brand CMA ([employer] Message Pool (10724_[facility]))  To: RODNEY EPSTEIN  Sent: 6/8/2020 9:36:38 AM CDT  Subject: FW: Selvin Epstein Deployment Information       Addendum by Kwame Brand CMA on June 08, 2020 9:36:32 AM CDT  Jesús Montalvo got your message but is having some trouble with what exactly you need done.  Is there a phone number of a medical person he could call and discuss this with where you are at?  Kwame Brand CMA       Addendum by Kwame Brand CMA on June 04, 2020 8:39:02 AM CDT  ---------------------  From: Kwame Brand CMA ([employer] Message Pool ([id number]_[facility]))  To: RODNEY EPSTEIN  Sent: 6/4/2020 8:39:02 AM CDT  Subject: FW: Selvin Epstein Deployment Information       Addendum by Kwame Brand CMA on June 04, 2020 8:38:57 AM CDT  Jonny Montalvo is out of clinic until 6/8/20 and will not be able to address this until then.  I will forward the message to him Monday.  Thank you Kwame Brand CMA  ---------------------  From: Shilpi Wilson RN (Phone Messages Pool (24324_[facility]))  To: LONNIE Message Pool (32224_[facility]);  Sent: 6/4/2020 8:20:26 AM CDT  Subject: FW: Selvin Epstein Deployment Information                      ---------------------  From: RODNEY EPSTEIN  To: [facility]  Sent: 06/03/2020 07:32 p.m. 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 I am more than capable of deploying without it as I do not use it regularly. The last time I used it was the beginning of February. I would like to discontinue my prescription as tramadol if possible. 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 I hope it all makes sense.  Lucian,  Please get the ADHD documentation stated below from your primary care provider (This is a direct annotation from our conversation and this information is only to be sent from your provider after their professional consultation - this is simply to help respond to the Waiver and is by no means a professional medical opinion/diagnosis/prognosis):  \"Per MOD 15, Tab A, please provide documentation of objective testing and/or clinical evaluation that supports the diagnosis of ADHD. Include the documentation from the initial diagnosis in 2017.\"  As far as the Tramadol, chronic and ongoing opioid use is disqualifying for deployment to [facility]. Please get a letter from your doctor to the effect of:  The patient, after careful review of the previous  prescription and lack of use of it, can function without the opioid and is to discontinue the medication. The [place] last consumed the prescription on 2020. The [place] is prescribed to take it 3 times a week but has no need to do so, and has not done so. This letter is to confirm stability from the 5th day of  to the present date and the medication is to be discontinued from the patient s medical records as of 2020. The patient is completely able to perform their duty of work scope without this prescription. Able to deploy immediately.  Also, please have your provider follow the template as to what additional information is to be provided in the LOS for the Tramodol to specifically include:  - " Medications: History, dosages, length, frequency of prescription and actual use, and actual need at present time  - Course of Diagnosis: Initial course of diagnosis up to present day including response and changes to the diagnosis to include discontinuation of prescription  - Prognosis: How the patient will perform and respond in an austere environment and how a follow-on appointment will not be necessary through the duration of the deployment.  - No active thoughts of homicide or suicide---------------------  From: Kwame Brand CMA ([employer] Pool ([facility])   To: Jonny Harrell PA-C;     Sent: 6/8/2020 10:38:55 AM CDT  Subject: FW: FW: Selvin Epstein Deployment Information---------------------  From: Jonny Harrell PA-C   To: [employer] (29724_[facility]);     Sent: 6/8/2020 10:49:50 AM CDT  Subject: RE: FW: Selvin Epstein Deployment Information     okay, thanks.  Please send in the letter I just wrote and see if that solves all the issues.  And maybe put a note on the letter asking Kwame Krishnamurthy to contact us if they [facility] letter and documention faxed to [phone].  Confirmation received.  Kwame Brand CMa

## 2022-02-16 NOTE — LETTER
(Inserted Image. Unable to display)     August 21, 2020      RODNEY FLETCHER  [address]   [place], [place] [id number]          Dear RODNEY,      Thank you for selecting [facility] ([facility]viously [facility] for your healthcare needs.      Our records indicate you are due for the following services:     Follow-up office visit.      To schedule an appointment or if you have further questions, please contact your primary clinic:   [facility]       [phone]   [facility]       [phone]              [facility]     [phone]      Powered by [employer]    Sincerely,    Jonny Harrell PA-C

## 2022-02-16 NOTE — NURSING NOTE
Comprehensive Intake Entered On:  8/3/2021 3:35 PM CDT    Performed On:  8/3/2021 3:27 PM CDT by Kwame Brand CMA               Summary   Chief Complaint :   Pt here for a DOT Px.   Weight Measured :   252 lb(Converted to: 252 lb 0 oz, 114.305 kg)    Height Measured :   74.75 in(Converted to: 6 ft 3 in, 189.86 cm)    Body Mass Index :   31.71 kg/m2 (HI)    Body Surface Area :   2.45 m2   Systolic Blood Pressure :   110 mmHg   Diastolic Blood Pressure :   80 mmHg   Mean Arterial Pressure :   90 mmHg   Peripheral Pulse Rate :   88 bpm   BP Site :   Right arm   Pulse Site :   Radial artery   Temperature Tympanic :   98.4 DegF(Converted to: 36.9 DegC)    Respiratory Rate :   16 br/min   Languages :   English   Kwame Brand CMA - 8/3/2021 3:27 PM CDT   Health Status   Allergies Verified? :   Yes   Medication History Verified? :   Yes   Medical History Verified? :   Yes   Pre-Visit Planning Status :   Completed   Tobacco Use? :   Current some day smoker   Kwame Brand CMA - 8/3/2021 3:27 PM CDT   Meds / Allergies   (As Of: 8/3/2021 3:35:47 PM CDT)   Allergies (Active)   iodine  Estimated Onset Date:   Unspecified ; Reactions:   scratchy throat, Swelling ; Created By:   Vonda Shaw; Reaction Status:   Active ; Category:   Drug ; Substance:   iodine ; Type:   Allergy ; Updated By:   Vonda Shaw; Reviewed Date:   8/3/2021 3:32 PM CDT        Medication List   (As Of: 8/3/2021 3:35:47 PM CDT)   Prescription/Discharge Order    amphetamine-dextroamphetamine  :   amphetamine-dextroamphetamine ; Status:   Prescribed ; Ordered As Mnemonic:   Adderall 20 mg oral tablet ; Simple Display Line:   20 mg, 1 tab(s), PO, tid, for 30 day(s), fill on or after 7/15/21, 90 tab(s), 0 Refill(s) ; Ordering Provider:   Jonny Harrell PA-C; Catalog Code:   amphetamine-dextroamphetamine ; Order Dt/Tm:   6/18/2021 10:44:02 AM CDT          amphetamine-dextroamphetamine  :   amphetamine-dextroamphetamine ; Status:   Prescribed ;  Ordered As Mnemonic:   Adderall 20 mg oral tablet ; Simple Display Line:   20 mg, 1 tab(s), PO, tid, for 30 day(s), fill on or afte 6/18/21, 90 tab(s), 0 Refill(s) ; Ordering Provider:   Jonny Harrell PA-C; Catalog Code:   amphetamine-dextroamphetamine ; Order Dt/Tm:   6/18/2021 10:43:42 AM CDT            Vision Testing POC   Corrective Lenses :   None   Eye, Left Visual Acuity :   20/13   Eye, Right Visual Acuity :   20/13   Eye, Bilateral Visual Acuity :   20/13   Kwame Brand CMA - 8/3/2021 3:27 PM CDT   Family History   Family History   (As Of: 8/3/2021 3:35:47 PM CDT)   Mother:   Relation:   Mother ; Gender:   Female ;           Nomenclature:   Diabetes mellitus - adult onset ; Value:   Positive          Father:   Relation:   Father ; Gender:   Male ;           Nomenclature:   Diabetes mellitus - adult onset ; Value:   Positive          Sister:   Relation:   Sister ; Gender:   Female ;           Nomenclature:   Diabetes mellitus ; Value:   Positive          Brother:   Relation:   Brother ; Gender:   Male ;           Nomenclature:   Diabetes mellitus ; Value:   Positive            Social History   Social History   (As Of: 8/3/2021 3:35:47 PM CDT)   Tobacco:  Current      4 or less cigarettes(less than 1/4 pack)/day in last 30 days   Comments:  4/17/2020 1:54 PM - Kwame Brand CMA: Pt smokes a pack a week.   (Last Updated: 4/20/2021 7:42:06 AM CDT by Kwame Brand CMA)          Electronic Cigarette/Vaping:        Electronic Cigarette Use: Never.   (Last Updated: 4/20/2021 7:42:09 AM CDT by Kwame Brand CMA)            OB/GYN History   Pregnancy History   (As Of: 8/3/2021 3:35:47 PM CDT)   No pregnancy history documented

## 2022-02-16 NOTE — PROGRESS NOTES
Patient:   RODNEY FLETCHER            MRN: [id number]            FIN: [id number]               Age:   31 years     Sex:  Male     :  1990   Associated Diagnoses:   Adult ADHD   Author:   Jonny Harrell PA-C      Visit Information      Date of Service: 2021 08:50 am  Performing Location: [facility]  Encounter#: [id number]      Primary Care Provider (PCP):  Jonny Harrell PA-C    NPI# [id number]   Visit type:  Telephone Encounter.    Source of history:  Patient.    Location of patient:  _home  Call Start Time:   _1116  Call End Time:    _1120      Chief Complaint   2021 11:00 AM CDT   Verbal consent given for a telephone visit.  Pt needing refill on his ADHD medication.     _      History of Present Illness   Today's visit was conducted via telephone due to the COVID-19 pandemic. Patient's consent to telephone visit was obtained and documented.      Reason for visit:  _  21:      The patient presents with Presents for follow up of attention deficit disorder.  There have been no problems with the medication.  There are no other current concerns..     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  During this deployment his Adderall was increased to tid dosing and he finds that dosing schedule works well for him, he did try the XR version but did not   like the way that made him feel and the results were not consistent  he has a hx of low back pain but it was not a problem during the deployment, he was doing regular core exercises    21:  visit today for refills of Adderall, doing well on the tid dosing  he is living down in [place] now and would his Rx sent there, he is not coming up to [place] very much any more         Review of Systems   Constitutional:  Negative.    Ear/Nose/Mouth/Throat:  Negative.    Respiratory:  Negative.    Gastrointestinal:  Negative.       Impression and Plan   Diagnosis     Adult ADHD (TFL07-TQ F90.0).      Course:  Good response to treatment.    Summary:  Rxs given for 2 months, [facility] [place] will call for 3rd and 4th months, rec[facility] in 4 months[facility] noted   .    Orders     Orders   Pharmacy:  Adderall 20 mg oral tablet (Prescribe): = 1 tab(s) ( 20 mg ), PO, tid, x 30 day(s), Instructions: fill on or after 9/14/21, # 90 tab(s), 0 Refill(s), Type: Maintenance, Pharmacy: [facility] #[id number], 1 tab(s) Oral tid,x30 day(s),Instr:fill on or after 9/14/21, 74.75, in, 8/16/2021 11:00 AM CDT, Height Measured, 252, lb, 8/3/2021 3:27 PM CDT, Weight Measured  Adderall 20 mg oral tablet (Prescribe): = 1 tab(s) ( 20 mg ), PO, tid, x 30 day(s), Instructions: fill on or afte 8/16/21, # 90 tab(s), 0 Refill(s), Type: Maintenance, Pharmacy: [facility] #[id number], 1 tab(s) Oral tid,x30 day(s),Instr:fill on or afte 8/16/21, 74.75, in, 8/16/2021 11:00 AM CDT, Height Measured, 252, lb, 8/3/2021 3:27 PM CDT, Weight Measured  Adderall 20 mg oral tablet (Modify): = 1 tab(s) ( 20 mg ), PO, tid, x 30 day(s), Instructions: fill on or afte 6/18/21, # 90 tab(s), 0 Refill(s), Type: Hard Stop, Pharmacy: [facility] #[id number], 74.75, in, 04/20/21 7:41:00 CDT, Height Measured, 249, lb, 04/20/21 7:41:00 CDT, Weight Measured  Adderall 20 mg oral tablet (Modify): = 1 tab(s) ( 20 mg ), PO, tid, x 30 day(s), Instructions: fill on or after 7/15/21, # 90 tab(s), 0 Refill(s), Type: Hard Stop, Pharmacy: [facility] #[id number], 74.75, in, 04/20/21 7:41:00 CDT, Height Measured, 249, lb, 04/20/21 7:41:00 CDT, Weight Measured.     Orders   Charges (Evaluation and Management):  30121 office o/p est low 20-29 min (Charge) (Order): Quantity: 1, Adult ADHD.        Health Status   Allergies:    Allergic Reactions (Selected)  Severity Not Documented  Iodine (Scratchy throat and swelling)   Medications:  (Selected)   Prescriptions  Prescribed  Adderall 20 mg oral tablet: = 1 tab(s) ( 20  mg ), PO, tid, Instructions: fill on or afte 6/18/21, # 90 tab(s), 0 Refill(s), Type: Maintenance, [facility][id number], 1 tab(s) Oral tid,x30 day(s),Instr:fill on or afte 6/18/21, 74.75, in, 04/20/21 7:41:00 CDT, Heig...  Adderall 20 mg oral tablet: = 1 tab(s) ( 20 mg ), PO, tid, Instructions: fill on or after 7/15/21, # 90 tab(s), 0 Refill(s), Type: Maintenance, Pharmacy: [facility] #[id number], 1 tab(s) Oral tid,x30 day(s),Instr:fill on or after 7/15/21, 74.75, in, 04/20/21 7:41:00 CDT, He...   Problem list:    All Problems  Obesity / ICD-9-.00 / Probable  Adult ADHD / SNOMED CT 9384627559 / Confirmed  Cigarette Smoker / ICD-9-.1 / Confirmed  ADD (attention deficit disorder) / SNOMED CT 862989253 / Confirmed  Low back pain / SNOMED CT 031759329 / Confirmed  Family History of Diabetes Mellitus (DM) / ICD-9-CM V18.0 / Confirmed      Histories   Past Medical History:    Resolved  Back pain ([id number]):  Resolved.   Family History:    Diabetes mellitus  Sister  Brother  Diabetes mellitus - adult onset  Mother  Father     Procedure history:    No active procedure history items have been selected or recorded.   Social History:        Electronic Cigarette/Vaping Assessment            Electronic Cigarette Use: Never.      Tobacco Assessment: Current            4 or less cigarettes(less than 1/4 pack)/day in last 30 days                     Comments:                      04/17/2020 - Kwame Brand CMA                     Pt smokes a pack a week.        Physical Examination   Measurements from flowsheet : Measurements   8/16/2021 11:00 AM CDT   Height Measured - Standard                74.75 in

## 2022-02-16 NOTE — LETTER
(Inserted Image. Unable to display)       January 06, 2020Re:  RODNEY FLETCHERDOB:  1990 [facility] [address] [address]   [facility],The following patient has been referred to your office/practice:  RODNEY FLETCHER Appointment is pending. Please call patient to schedule.  Please refer to the attached clinical documentation for a summary of RODNEY's care.  Please do not hesitate to contact our office if any additional clinical questions arise. All relevant records and transition of care documents should be mailed or faxed. Your assistance in providing continuity of care is appreciated. Sincerely, [facility] & [address]) [phone]([phone]

## 2022-02-16 NOTE — TELEPHONE ENCOUNTER
---------------------  From: Mei Pardo RN (Phone Messages Pool ([id number]_[facility]))   To: [employer] Message Pool ([address]_[facility]);     Sent: 11/8/2021 10:54:20 AM CST  Subject: Adderall Refill        PCP:   [email or web address]      Time of Call:  Voicemail left at 0955       Person Calling:  Pt  Phone number:  [phone]    Note:   Pt calling stating he is in need of his last refill of Adderall to be sent in.  Pt requesting this be sent to [facility] in [place].      Last filled 90 tabs, TID for 30 days. 20 mg tabs on 9/13/21    Last office visit and reason:  8/16/21  ADHD---------------------  From: Kwame Brand CMA ([employer] Message Pool ([id number]_[facility]))   To: Jonny Harrell PA-C;     Sent: 11/8/2021 11:26:23 AM CST  Subject: FW: Adderall Refill---------------------  From: Jonny Harrell PA-C   To: [employer] Message Pool ([id number]_[facility]);     Sent: 11/8/2021 11:43:20 AM CST  Subject: RE: Adderall Refill      [facility] no irregularities notedI called pt and gave him DWG message.  Pt will make ADHD phone visit for next month.  Kwame Brand CMA

## 2022-02-16 NOTE — NURSING NOTE
Comprehensive Intake Entered On:  6/28/2019 3:57 PM CDT    Performed On:  6/28/2019 3:53 PM CDT by Kwame Brand CMA               Summary   Chief Complaint :   Pt here for an ADHD medcheck and refills on his tramadol and meloxicam.   Weight Measured :   259 lb(Converted to: 259 lb 0 oz, 117.48 kg)    Height Measured :   74.75 in(Converted to: 6 ft 3 in, 189.86 cm)    Body Mass Index :   32.59 kg/m2 (HI)    Body Surface Area :   2.49 m2   Systolic Blood Pressure :   128 mmHg   Diastolic Blood Pressure :   86 mmHg (HI)    Mean Arterial Pressure :   100 mmHg   Peripheral Pulse Rate :   98 bpm   Pulse Site :   Radial artery   Temperature Tympanic :   98 DegF(Converted to: 36.7 DegC)    Respiratory Rate :   16 br/min   Languages :   English   Kwame Brand CMA - 6/28/2019 3:53 PM CDT   Health Status   Allergies Verified? :   Yes   Medication History Verified? :   Yes   Medical History Verified? :   Yes   Pre-Visit Planning Status :   Completed   Tobacco Use? :   Current every day smoker   Tobacco Cessation Review :   Not ready to quit   Kwame Brand CMA - 6/28/2019 3:53 PM CDT   Meds / Allergies   (As Of: 6/28/2019 3:57:45 PM CDT)   Allergies (Active)   iodine  Estimated Onset Date:   Unspecified ; Reactions:   scratchy throat, Swelling ; Created By:   Vonda Shaw; Reaction Status:   Active ; Category:   Drug ; Substance:   iodine ; Type:   Allergy ; Updated By:   Vonda Shaw; Reviewed Date:   6/28/2019 3:55 PM CDT        Medication List   (As Of: 6/28/2019 3:57:45 PM CDT)   Prescription/Discharge Order    amphetamine-dextroamphetamine  :   amphetamine-dextroamphetamine ; Status:   Prescribed ; Ordered As Mnemonic:   Adderall 20 mg oral tablet ; Simple Display Line:   20 mg, 1 tab(s), PO, BID, 60 tab(s), 0 Refill(s) ; Ordering Provider:   Himanshu Aldana MD; Catalog Code:   amphetamine-dextroamphetamine ; Order Dt/Tm:   5/4/2019 12:27:19 PM          traMADol  :   traMADol ; Status:   Prescribed ;  Ordered As Mnemonic:   traMADol 50 mg oral tablet ; Simple Display Line:   50 mg, 1 tab(s), PO, q4hr, PRN: for pain, 30 tab(s), 0 Refill(s) ; Ordering Provider:   Amaya Donohue; Catalog Code:   traMADol ; Order Dt/Tm:   3/7/2019 11:28:18 AM          meloxicam  :   meloxicam ; Status:   Prescribed ; Ordered As Mnemonic:   meloxicam 15 mg oral tablet ; Simple Display Line:   15 mg, 1 tab(s), PO, Daily, 90 tab(s), 1 Refill(s) ; Ordering Provider:   Amaya Donohue; Catalog Code:   meloxicam ; Order Dt/Tm:   3/7/2019 11:28:04 AM            Social History   Social History   (As Of: 6/28/2019 3:57:45 PM CDT)   Tobacco:  Current      Current, Cigarettes, Snuff   (Last Updated: 1/17/2012 10:31:53 AM CST by Jocy Garner CMA

## 2022-02-16 NOTE — NURSING NOTE
Comprehensive Intake Entered On:  12/2/2021 12:12 PM CST    Performed On:  12/2/2021 12:11 PM CST by Kwame Brand CMA               Summary   Chief Complaint :   Verbal consent given for a telephone visit.  Pt is calling for refills on his ADHD medications.   Height Measured :   74.75 in(Converted to: 6 ft 3 in, 189.86 cm)    Languages :   English   Cathie AMAURY, Kwame - 12/2/2021 12:11 PM CST   Health Status   Allergies Verified? :   Yes   Medication History Verified? :   Yes   Medical History Verified? :   Yes   Pre-Visit Planning Status :   Not completed   Tobacco Use? :   Current every day smoker   Cathie AMAURY Kwame - 12/2/2021 12:11 PM CST   Meds / Allergies   (As Of: 12/2/2021 12:12:31 PM CST)   Allergies (Active)   iodine  Estimated Onset Date:   Unspecified ; Reactions:   scratchy throat, Swelling ; Created By:   Vonda Shaw; Reaction Status:   Active ; Category:   Drug ; Substance:   iodine ; Type:   Allergy ; Updated By:   Vonda Shaw; Reviewed Date:   12/2/2021 12:12 PM CST        Medication List   (As Of: 12/2/2021 12:12:31 PM CST)   Prescription/Discharge Order    amphetamine-dextroamphetamine  :   amphetamine-dextroamphetamine ; Status:   Prescribed ; Ordered As Mnemonic:   Adderall 20 mg oral tablet ; Simple Display Line:   20 mg, 1 tab(s), PO, tid, for 30 day(s), fill on or after 11/8/21, 90 tab(s), 0 Refill(s) ; Ordering Provider:   Jonny Harrell PA-C; Catalog Code:   amphetamine-dextroamphetamine ; Order Dt/Tm:   11/8/2021 11:42:22 AM CST          amphetamine-dextroamphetamine  :   amphetamine-dextroamphetamine ; Status:   Prescribed ; Ordered As Mnemonic:   Adderall 20 mg oral tablet ; Simple Display Line:   20 mg, 1 tab(s), PO, tid, for 30 day(s), fill on or afte 8/16/21, 90 tab(s), 0 Refill(s) ; Ordering Provider:   Jonny Harrell PA-C; Catalog Code:   amphetamine-dextroamphetamine ; Order Dt/Tm:   8/16/2021 11:17:50 AM CDT            Social History   Social History   (As Of:  12/2/2021 12:12:31 PM CST)   Tobacco:  Current      4 or less cigarettes(less than 1/4 pack)/day in last 30 days   Comments:  4/17/2020 1:54 PM - Kwame Brand CMA: Pt smokes a pack a week.   (Last Updated: 4/20/2021 7:42:06 AM CDT by Kwame Brand CMA)          Electronic Cigarette/Vaping:        Electronic Cigarette Use: Never.   (Last Updated: 4/20/2021 7:42:09 AM CDT by Kwame Brand CMA)

## 2022-02-16 NOTE — TELEPHONE ENCOUNTER
---------------------  From: Macrina Burks (Phone Messages Pool ([id number]))   To: [facility] Message Pool ([address] [place] [address]);     Sent: 5/28/2020 12:08:27 PM CDT  Subject: FW: Letter of stability           ---------------------  From: RODNEY FLETCHER  To: [facility]  Sent: 05/28/2020 11:24 a.m. CDT  Subject: Letter of stability  This message is for Dr Jonny Harrell    I really apologize for another short notice request but the [employer] needs 1 more letter apparently.    Is there anyway possible I could get another letter of stability regarding my tramadol prescription. 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 I greatly appreciate it---------------------  From: Kwame Brand CMA ([facility] Message Pool ([facility]))   To: Jonny Harrell PA-C;     Sent: 5/28/2020 12:24:50 PM CDT  Subject: FW: Letter of stability---------------------  From: Jonny Harrell PA-C   To: [facility] Message Pool ([id number]);     Sent: 5/28/2020 1:56:24 PM CDT  Subject: RE: Letter of stability     A second letter is printed and signed, please faxChris, I have faxed the new letter for tramadol to [phone], 4 times and have not received confirmation that it went through.  Can you please confirm the fax number again.  Thank You Kwame Brand CMA---------------------  From: Kwame Brand CMA ([facility] Message Pool ([id number]))   To: RODNEY FLETCHER    Sent: 5/28/2020 4:10:59 PM CDT  Subject: FW: Letter of stabilityTried faxing letter again with no success. Tried contacting pt but phone line doesn't even ring. If he calls back please confirm the correct fax number. Seems like the 1st letter was sent successfully a few days ago so not sure why this one is not going through. If we don't hear from the patient today and continue to have difficulty faxing I will try calling the office  "number on the letter to confirm correct fax number.I tried calling the office number [phone] and it was the fax number.  I then called the listed fax number of [phone] and it was a telephone number.  I was unable to leave a message at this number as message states \"the number you are trying to reach is unavailable\"  I did fax letter to [phone] and did receive confirmation.  Kwame Zimmermanris, it looks like the phone number and fax number may have been switched.  I did fax the letter to [phone] and did receive confirmation.  I am unable to get ahold of anybody or leave message at [phone].  Kwame Brand CMA---------------------  From: Kwame Brand CMA ([facility] Message Pool ([id number]_[facility]))   To: RODNEY FLETCHER    Sent: 6/1/2020 8:20:40 AM CDT  Subject: FW: Letter of stability  "

## 2022-02-16 NOTE — NURSING NOTE
Comprehensive Intake Entered On:  8/16/2021 11:01 AM CDT    Performed On:  8/16/2021 11:00 AM CDT by Kwame Brand CMA               Summary   Chief Complaint :   Verbal consent given for a telephone visit.  Pt needing refill on his ADHD medication.   Height Measured :   74.75 in(Converted to: 6 ft 3 in, 189.86 cm)    Languages :   English   Cathie AMAURY, Kwame - 8/16/2021 11:00 AM CDT   Health Status   Allergies Verified? :   Yes   Medication History Verified? :   Yes   Medical History Verified? :   Yes   Pre-Visit Planning Status :   Not completed   Tobacco Use? :   Current every day smoker   Kwame Brand CMA - 8/16/2021 11:00 AM CDT   Meds / Allergies   (As Of: 8/16/2021 11:01:59 AM CDT)   Allergies (Active)   iodine  Estimated Onset Date:   Unspecified ; Reactions:   scratchy throat, Swelling ; Created By:   Vonda Shaw; Reaction Status:   Active ; Category:   Drug ; Substance:   iodine ; Type:   Allergy ; Updated By:   Vonda Shaw; Reviewed Date:   8/3/2021 3:32 PM CDT        Medication List   (As Of: 8/16/2021 11:01:59 AM CDT)   Prescription/Discharge Order    amphetamine-dextroamphetamine  :   amphetamine-dextroamphetamine ; Status:   Prescribed ; Ordered As Mnemonic:   Adderall 20 mg oral tablet ; Simple Display Line:   20 mg, 1 tab(s), PO, tid, for 30 day(s), fill on or after 7/15/21, 90 tab(s), 0 Refill(s) ; Ordering Provider:   Jonny Harrell PA-C; Catalog Code:   amphetamine-dextroamphetamine ; Order Dt/Tm:   6/18/2021 10:44:02 AM CDT          amphetamine-dextroamphetamine  :   amphetamine-dextroamphetamine ; Status:   Prescribed ; Ordered As Mnemonic:   Adderall 20 mg oral tablet ; Simple Display Line:   20 mg, 1 tab(s), PO, tid, for 30 day(s), fill on or afte 6/18/21, 90 tab(s), 0 Refill(s) ; Ordering Provider:   Jonny Harrell PA-C; Catalog Code:   amphetamine-dextroamphetamine ; Order Dt/Tm:   6/18/2021 10:43:42 AM CDT            Social History   Social History   (As Of: 8/16/2021  11:01:59 AM CDT)   Tobacco:  Current      4 or less cigarettes(less than 1/4 pack)/day in last 30 days   Comments:  4/17/2020 1:54 PM - Kwame Brand CMA: Pt smokes a pack a week.   (Last Updated: 4/20/2021 7:42:06 AM CDT by Kwame Brand CMA)          Electronic Cigarette/Vaping:        Electronic Cigarette Use: Never.   (Last Updated: 4/20/2021 7:42:09 AM CDT by Kwame Brand CMA)

## 2022-02-16 NOTE — NURSING NOTE
Urine Dipstick POC Entered On:  8/6/2021 9:24 AM CDT    Performed On:  8/3/2021 9:23 AM CDT by Brigid Munguia               Urine Dipstick POC   Urine Color Urine Dipstick :   Yellow   Urine Appearance Urine Dipstick :   Clear   Glucose Urine Dipstick :   Negative   Bilirubin Urine Dipstick :   Negative   Ketones Urine Dipstick :   Negative   Specific Gravity Urine Dipstick :   1.030   Blood Urine Dipstick :   Negative   pH Urine Dipstick :   5.5   Protein Urine Dipstick :   Negative   Urobilinogen Urine Dipstick :   0.2 mg/dl   Nitrite Urine Dipstick :   Negative   Leukocytes Urine Dipstick :   Negative   POC Test Comments :   Performed at [facility]   Brigid Munguia  8/6/2021 9:23 AM CDT

## 2022-02-16 NOTE — NURSING NOTE
Comprehensive Intake Entered On:  10/2/2019 6:29 PM CDT    Performed On:  10/2/2019 6:25 PM CDT by Kwame Brand CMA               Summary   Chief Complaint :   Pt here for an ADHD medcheck and refills on his tramadol and meloxicam.   Weight Measured :   264 lb(Converted to: 264 lb 0 oz, 119.75 kg)    Height Measured :   74.75 in(Converted to: 6 ft 3 in, 189.86 cm)    Body Mass Index :   33.22 kg/m2 (HI)    Body Surface Area :   2.51 m2   Systolic Blood Pressure :   132 mmHg (HI)    Diastolic Blood Pressure :   82 mmHg (HI)    Mean Arterial Pressure :   99 mmHg   Peripheral Pulse Rate :   80 bpm   BP Site :   Right arm   Pulse Site :   Radial artery   Temperature Tympanic :   97.8 DegF(Converted to: 36.6 DegC)  (LOW)    Respiratory Rate :   16 br/min   Languages :   English   Kwame Brand CMA - 10/2/2019 6:25 PM CDT   Health Status   Allergies Verified? :   Yes   Medication History Verified? :   Yes   Medical History Verified? :   Yes   Pre-Visit Planning Status :   Not completed   Tobacco Use? :   Current some day smoker   Tobacco Cessation Review :   Not ready to quit   Kwame Brand CMA - 10/2/2019 6:25 PM CDT   Meds / Allergies   (As Of: 10/2/2019 6:29:17 PM CDT)   Allergies (Active)   iodine  Estimated Onset Date:   Unspecified ; Reactions:   scratchy throat, Swelling ; Created By:   Vonda Shaw; Reaction Status:   Active ; Category:   Drug ; Substance:   iodine ; Type:   Allergy ; Updated By:   Vonda Shaw; Reviewed Date:   7/5/2019 10:58 AM CDT        Medication List   (As Of: 10/2/2019 6:29:17 PM CDT)   Prescription/Discharge Order    amphetamine-dextroamphetamine  :   amphetamine-dextroamphetamine ; Status:   Processing ; Ordered As Mnemonic:   Adderall 20 mg oral tablet ; Ordering Provider:   Jonny Harrell PA-C; Action Display:   Complete ; Catalog Code:   amphetamine-dextroamphetamine ; Order Dt/Tm:   10/2/2019 6:26:19 PM          amphetamine-dextroamphetamine  :    amphetamine-dextroamphetamine ; Status:   Prescribed ; Ordered As Mnemonic:   Adderall 20 mg oral tablet ; Simple Display Line:   20 mg, 1 tab(s), PO, BID, 60 tab(s), 0 Refill(s) ; Ordering Provider:   Dorothy Beaulieu; Catalog Code:   amphetamine-dextroamphetamine ; Order Dt/Tm:   8/26/2019 3:41:58 PM          traMADol  :   traMADol ; Status:   Prescribed ; Ordered As Mnemonic:   traMADol 50 mg oral tablet ; Simple Display Line:   50 mg, 1 tab(s), PO, q4hr, PRN: for pain, 30 tab(s), 0 Refill(s) ; Ordering Provider:   Jonny Harrell PA-C; Catalog Code:   traMADol ; Order Dt/Tm:   6/28/2019 4:16:15 PM          meloxicam  :   meloxicam ; Status:   Prescribed ; Ordered As Mnemonic:   meloxicam 15 mg oral tablet ; Simple Display Line:   15 mg, 1 tab(s), PO, Daily, 90 tab(s), 3 Refill(s) ; Ordering Provider:   Jonny Harrell PA-C; Catalog Code:   meloxicam ; Order Dt/Tm:   6/28/2019 4:15:34 PM            Social History   Social History   (As Of: 10/2/2019 6:29:17 PM CDT)   Tobacco:  Current      Current, Cigarettes, Snuff   (Last Updated: 1/17/2012 10:31:53 AM CST by Jocy Garner CMA

## 2022-02-16 NOTE — LETTER
(Inserted Image. Unable to display)   February 27, 2019        RODNEY FLETCHER  [address] [place], [place] [id number]        Dear RODNEY,      Thank you for selecting [facility] ([facility]viously [facility]) for your healthcare needs.    Our records indicate you are due for the following services:     Follow-up office visit    To schedule an appointment or if you have further questions, please contact your primary clinic:   [facility]       [phone]   [facility]       [phone]              [facility]     [phone]      Powered by [employer]    Sincerely,    Amaya Fernandez NP

## 2022-02-16 NOTE — NURSING NOTE
Chief Complaint   Patient presents with   • Annual Exam   • Fatigue   • Tendonitis     worse in right thumb        History of Present Illness  HM, Adult Female: The patient is being seen for a health maintenance  evaluation. The last health maintenance visit was 1 year(s) ago. GYN- Dr. Orozco.  Social History: She is . Work status: FULL TIME  She has never smoked. She reports never drinking alcohol. Denies any illicit drug use.  General Health: The patient's health is described as good. She has regular dental visits. She denies vision problems.Wears glasses. She denies hearing loss. Immunizations status: up to date.   Lifestyle:. She consumes a diverse and healthy diet. She does have any weight concerns. She exercises regularly. She denies tobacco. She denies alcohol use. She denies drug use.   Reproductive health:. She IS POSTMENOPAUSAL.   Screening: Cancer screening reviewed and current.   Metabolic screening reviewed and current.   Risk screening reviewed and current.     Having more fatigue and aches and pains.  Rt thumb pain over the base/ thenar eminence.    Review of Systems   Constitutional: Positive for fatigue. Negative for chills and fever.   HENT: Negative for ear pain, hearing loss, nosebleeds, postnasal drip, sinus pressure and sore throat.    Eyes: Negative for pain, redness and itching.   Respiratory: Negative for cough, shortness of breath and wheezing.    Cardiovascular: Negative for chest pain, palpitations and leg swelling.   Gastrointestinal: Negative for abdominal distention, abdominal pain, blood in stool, constipation and diarrhea.   Endocrine: Negative for cold intolerance, heat intolerance and polyuria.   Genitourinary: Negative for dysuria, flank pain, hematuria and pelvic pain.   Musculoskeletal: Positive for arthralgias and myalgias. Negative for back pain and joint swelling.   Skin: Negative for pallor and wound.   Neurological: Negative for tremors, seizures, weakness,  Comprehensive Intake Entered On:  3/7/2019 10:58 AM CST    Performed On:  3/7/2019 10:53 AM CST by Margaret Verma CMA               Summary   Chief Complaint :   f/u ADD. Discuss tramadol refill.   Weight Measured :   266 lb(Converted to: 266 lb 0 oz, 120.66 kg)    Systolic Blood Pressure :   134 mmHg (HI)    Diastolic Blood Pressure :   90 mmHg (HI)    Mean Arterial Pressure :   105 mmHg   Peripheral Pulse Rate :   88 bpm   BP Site :   Right arm   Pulse Site :   Radial artery   BP Method :   Manual   HR Method :   Manual   Languages :   English   Margaret Verma CMA - 3/7/2019 10:53 AM CST   Health Status   Allergies Verified? :   Yes   Medication History Verified? :   Yes   Pre-Visit Planning Status :   Completed   Margaret Verma CMA - 3/7/2019 10:53 AM CST   Meds / Allergies   (As Of: 3/7/2019 10:59:00 AM CST)   Allergies (Active)   iodine  Estimated Onset Date:   Unspecified ; Reactions:   scratchy throat, Swelling ; Created By:   Vonda Shaw; Reaction Status:   Active ; Category:   Drug ; Substance:   iodine ; Type:   Allergy ; Updated By:   Vonda Shaw; Reviewed Date:   11/26/2018 8:43 PM CST        Medication List   (As Of: 3/7/2019 10:59:00 AM CST)   Prescription/Discharge Order    amphetamine-dextroamphetamine  :   amphetamine-dextroamphetamine ; Status:   Prescribed ; Ordered As Mnemonic:   Adderall 20 mg oral tablet ; Simple Display Line:   20 mg, 1 tab(s), PO, BID, 60 tab(s), 0 Refill(s) ; Ordering Provider:   Amaya Donohue; Catalog Code:   amphetamine-dextroamphetamine ; Order Dt/Tm:   1/25/2019 5:20:22 PM          amphetamine-dextroamphetamine  :   amphetamine-dextroamphetamine ; Status:   Prescribed ; Ordered As Mnemonic:   Adderall 20 mg oral tablet ; Simple Display Line:   20 mg, 1 tab(s), PO, BID, 60 tab(s), 0 Refill(s) ; Ordering Provider:   Amaya Donohue; Catalog Code:   amphetamine-dextroamphetamine ; Order Dt/Tm:   11/26/2018 6:54:45 PM          meloxicam  :    light-headedness and headaches.   Psychiatric/Behavioral: Negative for agitation, confusion, hallucinations and sleep disturbance.       Patient Active Problem List   Diagnosis   • Depression   • ADD (attention deficit disorder)   • Gout of big toe   • Hyperlipidemia   • Insomnia secondary to situational depression   • Health maintenance examination       Social History     Social History   • Marital status:      Spouse name: N/A   • Number of children: N/A   • Years of education: N/A     Occupational History   • Not on file.     Social History Main Topics   • Smoking status: Never Smoker   • Smokeless tobacco: Never Used   • Alcohol use Yes      Comment: occasional   • Drug use: No   • Sexual activity: Not on file     Other Topics Concern   • Not on file     Social History Narrative       Current Outpatient Prescriptions on File Prior to Visit   Medication Sig Dispense Refill   • atorvastatin (LIPITOR) 10 MG tablet TAKE ONE TABLET BY MOUTH DAILY 30 tablet 0   • Methylphenidate HCl ER (CONCERTA) 18 MG CR tablet Take 18 mg by mouth every morning.       No current facility-administered medications on file prior to visit.        Allergies   Allergen Reactions   • Amoxicillin Rash   • Keflex [Cephalexin] Rash       /88  Pulse 65  Wt 239 lb 9.6 oz (109 kg)  LMP  (Approximate)  SpO2 98% Comment: ra  BMI 38.67 kg/m2           The following portions of the patient's history were reviewed and updated as appropriate: allergies, current medications, past family history, past medical history, past social history, past surgical history and problem list.    Physical Exam   Constitutional: She is oriented to person, place, and time. She appears well-developed and well-nourished.   HENT:   Head: Normocephalic and atraumatic.   Right Ear: External ear normal.   Left Ear: External ear normal.   Mouth/Throat: No oropharyngeal exudate.   Eyes: Conjunctivae are normal. Pupils are equal, round, and reactive to light.  meloxicam ; Status:   Prescribed ; Ordered As Mnemonic:   meloxicam 15 mg oral tablet ; Simple Display Line:   15 mg, 1 tab(s), PO, Daily, 90 tab(s), 1 Refill(s) ; Ordering Provider:   Amaya Donohue; Catalog Code:   meloxicam ; Order Dt/Tm:   11/26/2018 6:57:29 PM          traMADol  :   traMADol ; Status:   Prescribed ; Ordered As Mnemonic:   traMADol 50 mg oral tablet ; Simple Display Line:   50 mg, 1 tab(s), PO, q4hr, PRN: for pain, 30 tab(s), 0 Refill(s) ; Ordering Provider:   Amaya Donohue; Catalog Code:   traMADol ; Order Dt/Tm:   4/26/2018 8:40:36 AM     Neck: Neck supple. No thyromegaly present.   Cardiovascular: Normal rate, regular rhythm and intact distal pulses.  Exam reveals no friction rub.    No murmur heard.  Pulmonary/Chest: Effort normal and breath sounds normal. She has no wheezes. She has no rales.   Abdominal: Soft. Bowel sounds are normal. She exhibits no distension. There is no tenderness. There is no rebound and no guarding.   Musculoskeletal: She exhibits tenderness (Rt base of thumb). She exhibits no edema.   Lymphadenopathy:     She has no cervical adenopathy.   Neurological: She is alert and oriented to person, place, and time. She displays normal reflexes. No cranial nerve deficit. She exhibits normal muscle tone. Coordination normal.   Skin: Skin is warm and dry.   Psychiatric: She has a normal mood and affect. Her behavior is normal. Judgment and thought content normal.   Nursing note and vitals reviewed.      Results for orders placed or performed in visit on 08/02/17   POC Urinalysis Dipstick, Automated   Result Value Ref Range    Color Yellow Yellow, Straw, Dark Yellow, Fannie    Clarity, UA Clear Clear    Glucose, UA Negative Negative, 1000 mg/dL (3+) mg/dL    Bilirubin Negative Negative    Ketones, UA Negative Negative    Specific Gravity  1.010 1.005 - 1.030    Blood, UA Negative Negative    pH, Urine 7.0 5.0 - 8.0    Protein, POC Negative Negative mg/dL    Urobilinogen, UA Normal Normal    Leukocytes Negative Negative    Nitrite, UA Negative Negative       Deborah was seen today for annual exam, fatigue and tendonitis.    Diagnoses and all orders for this visit:    Healthcare maintenance  -     POC Urinalysis Dipstick, Automated  -     CBC (No Diff)  -     Comprehensive Metabolic Panel  -     Lipid Panel  -     Hemoglobin A1c  -     TSH  -     Vitamin D 25 Hydroxy    Mixed hyperlipidemia  Comments:  nOT TAKING THE LIPITOR,   Orders:  -     Comprehensive Metabolic Panel  -     Lipid Panel    Family history of diabetes mellitus  -      Hemoglobin A1c    Thumb tendonitis  Comments:  adv. to wear wrist and thumb splint, to prevent worsening, topical voltaren and massage.    Trochanteric bursitis of left hip  -     diclofenac (VOLTAREN) 1 % gel gel; Apply 4 g topically 4 (Four) Times a Day As Needed (pain).    Seasonal allergic rhinitis, unspecified allergic rhinitis trigger  -     fluticasone (FLONASE) 50 MCG/ACT nasal spray; 2 sprays into each nostril Daily for 30 days. Administer 2 sprays in each nostril for each dose for allergies          Health Maintenance   Topic Date Due   • LIPID PANEL  08/02/2017   • INFLUENZA VACCINE  09/01/2017   • PAP SMEAR  03/22/2019   • MAMMOGRAM  03/24/2019   • COLONOSCOPY  09/10/2024   • TDAP/TD VACCINES (2 - Td) 05/13/2026   • HEPATITIS C SCREENING  Completed       Discussion/Summary  Impression: health maintenance visit. Currently, she eats an adequate diet and has an adequate exercise regimen.   Cervical cancer screening:Pap smear is current.   Breast cancer screening: mammogram is current.   Colorectal cancer screening: colonoscopy is current.  Osteoporosis screening: Bone mineral density test is indicated at 60 .   Screening lab work includes hemoglobin, glucose, lipid profile, thyroid function testing, 25-hydroxyvitamin D and urinalysis.   Immunizations are needed, zostavax Rx given, adv. To check with Dr. Norwood as Parrish is s/p recent Chemo.    Return in about 6 months (around 2/2/2018) for Next scheduled follow up.

## 2022-03-02 NOTE — TELEPHONE ENCOUNTER
---------------------  From: Radha Javier RN (Phone Messages Pool ([id number]_[facility]))   To: [facility] Message Pool ([id number]);     Sent: 1/31/2022 12:22:45 PM CST  Subject: Adderall refill       PCP:  BLADIMIR      Time of Call:  11:08am       Person Calling:  pt  Phone number:  [phone]    Note:   VM received from pt, requesting DWG to send in a refill of Adderall.     Adderall 20mg 1 tab TID #90 to fill or on or after 12/31/21    Please advise on refill.    Last office visit and reason: 12/2/21 phone/ADHD DWG---------------------  From: Kwame Brand CMA ([facility] Message Pool (30824[facility]))   To: Jonny Harrell PA-C;     Sent: 1/31/2022 12:34:58 PM CST  Subject: FW: Adderall refill---------------------  From: Jonny Harrell PA-C   To: [facility] Message Pool ([id number]_[facility]);     Sent: 1/31/2022 12:40:32 PM CST  Subject: RE: Adderall refill     [facility], no irregularities notedI called pt and gave him [facility] message.  Kwame Brand CMA

## 2022-03-04 NOTE — TELEPHONE ENCOUNTER
"---------------------  From: Deandra Zepeda LPN (Phone Messages Pool (32224_[facility]))   To: [facility] Message Pool (32224_[facility]);     Sent: 5/27/2020 4:26:21 PM CDT  Subject: CONSUMER MESSAGE FW: Letter of stabilization for  deployment           ---------------------  From: RODNEY FLETCHER  To: [facility]  Sent: 05/27/2020 04:21 p.m. CDT  Subject: Letter of stabilization for  deployment  This message is for BILL Dwyer    I m needing a letter of \"stability\" for my adderall. 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 They gave me an example that I attached. I am currently down in [place], [place] prepping for deployment to [facility] and am able to deploy and receive the adderall over there but I can t deploy without the letter from you.    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 Without it I will be kicked off this deployment and sent home. If there is ANY way possible you could send the letter as soon as possible I would greatly appreciate it. The deadline to have it is Friday so I really hope that works and I appreciate your time and effort on this matter!    It can be faxed to  Rosy Garrison BSN, RN  FAX: [phone]  Office: [phone]    My email  [email or web address]    Thank you again so much for your help  ---------------------  From: Mala Harris LPN ([employer] Message Pool (32224_[facility]))   To: Jonny Harrell PA-C;     Sent: 5/27/2020 4:41:48 PM CDT  Subject: FW: CONSUMER MESSAGE FW: Letter of stabilization for  deployment  ---------------------  From: Julio Cesar KOLB, Jonny GOODWIN   To: [facility] Message Pool (32224_[facility]); Kwame Brand CMA;     Sent: 5/27/2020 5:18:00 PM CDT  Subject: RE: CONSUMER MESSAGE FW: Letter of stabilization for  deployment     Letter is written " and faxed 5/27/2020, 1717 hrs  Jesús Montalvo wrote and faxed your letter 5/27/20 to [phone] Attn:  CPT Rosy Wilson.  Please let us know if they did not receive it and we will fax again.  Thank you and have a nice day.  Kwame Brand CMA  ---------------------  From: Kwame Brand CMA   To: RODNEY FLETCHER    Sent: 5/28/2020 8:41:40 AM CDT  Subject: FW: CONSUMER MESSAGE FW: Letter of stabilization for  deployment

## 2022-03-04 NOTE — TELEPHONE ENCOUNTER
---------------------  From: Macrina Burks (Phone Messages Pool ([facility]24[facility]))  To: [facility] Message Pool ([address] [place] [address]);     Sent: 5/28/2020 12:08:27 PM CDT  Subject: FW: Letter of stability           ---------------------  From: RODNEY FLETCHER  To: [facility]  Sent: 05/28/2020 11:24 a.m. CDT  Subject: Letter of stability  This message is for Dr Jonny Harrell    I really apologize for another short notice request but the army needs 1 more letter apparently.    Is there anyway possible I could get another letter of stability regarding my tramadol prescription. 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 I greatly appreciate it  ---------------------  From: Kwame Brand CMA ([facility] Message Pool ([facility]24[facility]))   To: Jonny Harrell PA-C;     Sent: 5/28/2020 12:24:50 PM CDT  Subject: FW: Letter of stability  ---------------------  From: Jonny Harrell PA-C   To: [facility] Message Pool ([facility]24[facility]);     Sent: 5/28/2020 1:56:24 PM CDT  Subject: RE: Letter of stability     A second letter is printed and signed, please fax  Selvin, I have faxed the new letter for tramadol to [phone], 4 times and have not received confirmation that it went through.  Can you please confirm the fax number again.  Thank You Kwame Brand CMA  ---------------------  From: Kwame Brand CMA ([facility] Message Pool ([address]224[facility]))   To: RODNEY FLETCHER    Sent: 5/28/2020 4:10:59 PM CDT  Subject: FW: Letter of stability  Tried faxing letter again with no success. Tried contacting pt but phone line doesn't even ring. If he calls back please confirm the correct fax number. Seems like the 1st letter was sent successfully a few days ago so not sure why this one is not going through. If we don't hear from the patient today and continue to have difficulty faxing I will try calling  "the office number on the letter to confirm correct fax number.  I tried calling the office number [phone] and it was the fax number.  I then called the listed fax number of [phone] and it was a telephone number.  I was unable to leave a message at this number as message states \"the number you are trying to reach is unavailable\"  I did fax letter to [phone] and did receive confirmation.  Kwame Montalvo, it looks like the phone number and fax number may have been switched.  I did fax the letter to [phone] and did receive confirmation.  I am unable to get ahold of anybody or leave message at [phone].  Kwame Brand CMA  ---------------------  From: Kwame Brand CMA ([facility] Message Pool (32224_[facility]))   To: RODNEY FLETCHER    Sent: 6/1/2020 8:20:40 AM CDT  Subject: FW: Letter of stability  "

## 2022-03-09 ENCOUNTER — TELEPHONE (OUTPATIENT)
Dept: FAMILY MEDICINE | Facility: CLINIC | Age: 32
End: 2022-03-09

## 2022-03-09 NOTE — TELEPHONE ENCOUNTER
Reason for Call:  Other Needs letter    Detailed comments: Patient would like a letter of stability for his adderral if you could email it to him his email is [email or web address]     Phone Number Patient can be reached at: Home number on file [phone] (home)    Best Time: any     Can we leave a detailed message on this number? YES    Call taken on 3/9/2022 at 5:06 PM by Rubina Philip

## 2022-03-09 NOTE — LETTER
2022      Kameron Epstein  [address] [place] [address]      SUBJECT:  LETTER OF STABILITY    RE:  Fermínsivan BENJAMIN Lucian,  1990       Kameron Epstein has been under my care for treatment of ADHD since 2018.  He was diagnosed with ADHD in 2017 (Wender [place] Rating Scale indicated 20/25 positive questions for ADHD, total score of 80/100).  He has been stable and well controlled on Adderall 20 mg 3 times daily.       This letter is to confirm stability on this medication and continued need for this medication.  The patient is completely able to perform their duty of work scope while on this medication.  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    If you have any questions or concerns, please call the clinic at the number listed above.       Sincerely,    Jonny Harrell PA-C

## 2022-03-29 ENCOUNTER — VIRTUAL VISIT (OUTPATIENT)
Dept: FAMILY MEDICINE | Facility: CLINIC | Age: 32
End: 2022-03-29
Payer: COMMERCIAL

## 2022-03-29 DIAGNOSIS — F90.2 ATTENTION DEFICIT HYPERACTIVITY DISORDER (ADHD), COMBINED TYPE: Primary | ICD-10-CM

## 2022-03-29 PROBLEM — E66.9 OBESITY: Status: ACTIVE | Noted: 2022-03-29

## 2022-03-29 PROBLEM — H72.90 TYMPANIC MEMBRANE PERFORATION: Status: ACTIVE | Noted: 2022-03-29

## 2022-03-29 PROBLEM — F17.210 CIGARETTE SMOKER: Status: ACTIVE | Noted: 2022-03-29

## 2022-03-29 PROBLEM — M25.519 ARTHRALGIA OF SHOULDER: Status: ACTIVE | Noted: 2022-03-29

## 2022-03-29 PROCEDURE — 99213 OFFICE O/P EST LOW 20 MIN: CPT | Mod: 95 | Performed by: PHYSICIAN ASSISTANT

## 2022-03-29 RX ORDER — DEXTROAMPHETAMINE SACCHARATE, AMPHETAMINE ASPARTATE, DEXTROAMPHETAMINE SULFATE AND AMPHETAMINE SULFATE 5; 5; 5; 5 MG/1; MG/1; MG/1; MG/1
20 TABLET ORAL 3 TIMES DAILY
Qty: 90 TABLET | Refills: 0 | Status: SHIPPED | OUTPATIENT
Start: 2022-04-26 | End: 2022-05-19

## 2022-03-29 RX ORDER — DEXTROAMPHETAMINE SACCHARATE, AMPHETAMINE ASPARTATE, DEXTROAMPHETAMINE SULFATE AND AMPHETAMINE SULFATE 5; 5; 5; 5 MG/1; MG/1; MG/1; MG/1
TABLET ORAL
COMMUNITY
Start: 2020-08-26 | End: 2022-03-29

## 2022-03-29 RX ORDER — TRAMADOL HYDROCHLORIDE 50 MG/1
TABLET ORAL
COMMUNITY
Start: 2020-04-18 | End: 2022-03-29

## 2022-03-29 RX ORDER — DEXTROAMPHETAMINE SACCHARATE, AMPHETAMINE ASPARTATE, DEXTROAMPHETAMINE SULFATE AND AMPHETAMINE SULFATE 5; 5; 5; 5 MG/1; MG/1; MG/1; MG/1
20 TABLET ORAL 3 TIMES DAILY
Qty: 90 TABLET | Refills: 0 | Status: SHIPPED | OUTPATIENT
Start: 2022-03-29 | End: 2022-05-19

## 2022-03-29 ASSESSMENT — ENCOUNTER SYMPTOMS
CARDIOVASCULAR NEGATIVE: 1
GASTROINTESTINAL NEGATIVE: 1
CONSTITUTIONAL NEGATIVE: 1
RESPIRATORY NEGATIVE: 1

## 2022-03-29 NOTE — PROGRESS NOTES
Pt letter of stability was reprinted and emailed to pt at [email or web address] per pt request.  I called pt to confirm that he received letter.  Pt states he did and now doesn't need to have a hard copy mailed to his home address.  [phone].  Kwame Brand [facility]  Answers for HPI/ROS submitted by the patient on 3/28/2022  How many servings of fruits and vegetables do you eat daily?: 0-1  On average, how many sweetened beverages do you drink each day (Examples: soda, juice, sweet tea, etc.  Do NOT count diet or artificially sweetened beverages)?: 1  How many minutes a day do you exercise enough to make your heart beat faster?: 60 or more  How many days a week do you exercise enough to make your heart beat faster?: 7  How many days per week do you miss taking your medication?: 0  What is the reason for your visit today?: HDHD Check up  When did your symptoms begin?: More than a month  What are your symptoms?: HDHD  How would you describe these symptoms?: Severe  Are your symptoms:: Staying the same  Have you had these symptoms before?: Yes  Have you tried or received treatment for these symptoms before?: Yes  Did that treatment work? : Yes  Please describe the treatment you had:: Adderall

## 2022-03-29 NOTE — PROGRESS NOTES
"Kameron is a 31 year old who is being evaluated via a billable video visit.      How would you like to obtain your AVS? MyChart  If the video visit is dropped, the invitation should be resent by: Text to cell phone: [phone]  Will anyone else be joining your video visit? No      Video Start Time: 9:05 AM    Assessment & Plan     Attention deficit hyperactivity disorder (ADHD), combined type  Will refill his Adderall for 2 months, can call for 3rd and 4th months,   PDMP is reviewed, no abnormalities noted  - amphetamine-dextroamphetamine (ADDERALL) 20 MG tablet; Take 1 tablet (20 mg) by mouth 3 times daily  - amphetamine-dextroamphetamine (ADDERALL) 20 MG tablet; Take 1 tablet (20 mg) by mouth 3 times daily             BMI:   Estimated body mass index is 31.71 kg/m  as calculated from the following:    Height as of 12/2/21: 1.899 m (6' 2.75\").    Weight as of 8/3/21: 114.3 kg (252 lb).           No follow-ups on file.    CORTES Carlson [facility]    Subjective   Kameron is a 31 year old who presents for the following health issues  accompanied by his self.    History of Present Illness       Reason for visit:  HDHD Check up  Symptom onset:  More than a month  Symptoms include:  HDHD  Symptom intensity:  Severe  Symptom progression:  Staying the same  Had these symptoms before:  Yes  Has tried/received treatment for these symptoms:  Yes  Previous treatment was successful:  Yes  Prior treatment description:  Adderall    He eats 0-1 servings of fruits and vegetables daily.He consumes 1 sweetened beverage(s) daily.He exercises with enough effort to increase his heart rate 60 or more minutes per day.  He exercises with enough effort to increase his heart rate 7 days per week.   He is taking medications regularly.     Phone visit today for follow up on ADHD   Doing well on the tid dosing  Sleep is not affected    He is getting ready to deploy to [facility]          Review of " Systems   Constitutional: Negative.    HENT: Negative.    Respiratory: Negative.    Cardiovascular: Negative.    Gastrointestinal: Negative.             Objective           Vitals:  No vitals were obtained today due to virtual visit.    Physical Exam  Pulmonary:      Breath sounds: Normal breath sounds.   Neurological:      Mental Status: He is alert.   Psychiatric:         Mood and Affect: Mood normal.         Thought Content: Thought content normal.                        Video-Visit Details    Type of service:  Video Visit    Video End Time:9:13 AM    Originating Location (pt. Location): Home    Distant Location (provider location):  [facility]     Platform used for Video Visit: Jamie

## 2022-04-03 ENCOUNTER — HEALTH MAINTENANCE LETTER (OUTPATIENT)
Age: 32
End: 2022-04-03

## 2022-05-18 DIAGNOSIS — F90.2 ATTENTION DEFICIT HYPERACTIVITY DISORDER (ADHD), COMBINED TYPE: Primary | ICD-10-CM

## 2022-05-18 RX ORDER — DEXTROAMPHETAMINE SACCHARATE, AMPHETAMINE ASPARTATE, DEXTROAMPHETAMINE SULFATE AND AMPHETAMINE SULFATE 5; 5; 5; 5 MG/1; MG/1; MG/1; MG/1
20 TABLET ORAL 3 TIMES DAILY
Qty: 90 TABLET | Refills: 0 | Status: CANCELLED | OUTPATIENT
Start: 2022-05-18

## 2022-05-18 NOTE — TELEPHONE ENCOUNTER
Reason for Call:  Other prescription    Detailed comments: Patient is leaving for  training for  Three weeks and would like refill on Adderall  By Friday 5.20.  Patient is leaving on 5.21.22. Patient is gone from 5.21.22-6.12.22.    Phone Number Patient can be reached at: Home number on file [phone] (home)    Best Time: anytime    Can we leave a detailed message on this number?yes    Call taken on 5/18/2022 at 12:18 PM by DARLYN VERGARA

## 2022-05-18 NOTE — TELEPHONE ENCOUNTER
Routing refill request to provider for review/approval because:  Drug not on the [facility] refill protocol: Adderall.  BLADIMIR o/c.   ADHD visit on 3/29/22.   Please advise on refill request.     Last Written Prescription Date: 4/26/22  Last Fill Quantity: 90,  # refills: 0   Last office visit with prescribing provider:   3/29/22 ADHD LONNIE

## 2022-05-19 ENCOUNTER — TELEPHONE (OUTPATIENT)
Dept: FAMILY MEDICINE | Facility: CLINIC | Age: 32
End: 2022-05-19

## 2022-05-19 DIAGNOSIS — F90.2 ATTENTION DEFICIT HYPERACTIVITY DISORDER (ADHD), COMBINED TYPE: ICD-10-CM

## 2022-05-19 RX ORDER — DEXTROAMPHETAMINE SACCHARATE, AMPHETAMINE ASPARTATE, DEXTROAMPHETAMINE SULFATE AND AMPHETAMINE SULFATE 5; 5; 5; 5 MG/1; MG/1; MG/1; MG/1
20 TABLET ORAL 3 TIMES DAILY
Qty: 90 TABLET | Refills: 0 | Status: SHIPPED | OUTPATIENT
Start: 2022-06-17 | End: 2022-07-19

## 2022-05-19 RX ORDER — DEXTROAMPHETAMINE SACCHARATE, AMPHETAMINE ASPARTATE, DEXTROAMPHETAMINE SULFATE AND AMPHETAMINE SULFATE 5; 5; 5; 5 MG/1; MG/1; MG/1; MG/1
20 TABLET ORAL 3 TIMES DAILY
Qty: 90 TABLET | Refills: 0 | Status: SHIPPED | OUTPATIENT
Start: 2022-05-19 | End: 2022-07-19

## 2022-05-19 NOTE — TELEPHONE ENCOUNTER
Reason for Call:  Other prescription    Detailed comments:Pharmacy calling stating that per patient that adderrall was to be filled today due to him leaving for some sort of leave. Otherwise they cannot fill until Monday. PCP is out of clinic please advise.     Phone Number Patient can be reached at: [phone] pharmacy     Best Time:anytime    Can we leave a detailed message on this number? YES    Call taken on 5/19/2022 at 1:21 PM by Daphne Brown

## 2022-07-19 ENCOUNTER — VIRTUAL VISIT (OUTPATIENT)
Dept: FAMILY MEDICINE | Facility: CLINIC | Age: 32
End: 2022-07-19
Payer: COMMERCIAL

## 2022-07-19 DIAGNOSIS — F90.2 ATTENTION DEFICIT HYPERACTIVITY DISORDER (ADHD), COMBINED TYPE: Primary | ICD-10-CM

## 2022-07-19 PROCEDURE — 99213 OFFICE O/P EST LOW 20 MIN: CPT | Mod: 95 | Performed by: PHYSICIAN ASSISTANT

## 2022-07-19 RX ORDER — DEXTROAMPHETAMINE SACCHARATE, AMPHETAMINE ASPARTATE, DEXTROAMPHETAMINE SULFATE AND AMPHETAMINE SULFATE 5; 5; 5; 5 MG/1; MG/1; MG/1; MG/1
20 TABLET ORAL 3 TIMES DAILY
Qty: 90 TABLET | Refills: 0 | Status: SHIPPED | OUTPATIENT
Start: 2022-07-19 | End: 2022-08-17

## 2022-07-19 RX ORDER — DEXTROAMPHETAMINE SACCHARATE, AMPHETAMINE ASPARTATE, DEXTROAMPHETAMINE SULFATE AND AMPHETAMINE SULFATE 5; 5; 5; 5 MG/1; MG/1; MG/1; MG/1
20 TABLET ORAL 3 TIMES DAILY
Qty: 90 TABLET | Refills: 0 | Status: SHIPPED | OUTPATIENT
Start: 2022-08-16 | End: 2022-08-19

## 2022-07-19 ASSESSMENT — ENCOUNTER SYMPTOMS
CARDIOVASCULAR NEGATIVE: 1
RESPIRATORY NEGATIVE: 1
GASTROINTESTINAL NEGATIVE: 1

## 2022-07-19 NOTE — PROGRESS NOTES
Kameron is a 32 year old who is being evaluated via a billable telephone visit.      What phone number would you like to be contacted at? [phone]  How would you like to obtain your AVS? Amnahart    1. Attention deficit hyperactivity disorder (ADHD), combined type  Well controlled, PDMP reviewed, will renew his medication for 2 months, can call for the 3rd and 4th months, follow up in 4 months  - amphetamine-dextroamphetamine (ADDERALL) 20 MG tablet; Take 1 tablet (20 mg) by mouth 3 times daily  Dispense: 90 tablet; Refill: 0  - amphetamine-dextroamphetamine (ADDERALL) 20 MG tablet; Take 1 tablet (20 mg) by mouth 3 times daily  Dispense: 90 tablet; Refill: 0      Subjective   Kameron is a 32 year old accompanied by his self, presenting for the following health issues:  Recheck Medication (ADHD medcheck and refills)      History of Present Illness       Reason for visit:  ADHD    He eats 0-1 servings of fruits and vegetables daily.He consumes 4 sweetened beverage(s) daily.He exercises with enough effort to increase his heart rate 60 or more minutes per day.  He exercises with enough effort to increase his heart rate 7 days per week.   He is taking medications regularly.       Phone visit today for follow up on ADHD   Doing well on the tid dosing  Sleep is not affected     He was getting ready to deploy to [employer] [facility] but that was cancelled           Review of Systems   HENT: Negative.    Respiratory: Negative.    Cardiovascular: Negative.    Gastrointestinal: Negative.             Objective           Vitals:  No vitals were obtained today due to virtual visit.    Physical Exam   healthy, alert and no distress  PSYCH: Alert and oriented times 3; coherent speech, normal   rate and volume, able to articulate logical thoughts, able   to abstract reason, no tangential thoughts, no hallucinations   or delusions  His affect is normal  RESP: No cough, no audible wheezing, able to talk in full sentences  Remainder of  exam unable to be completed due to telephone visits                Phone call duration: 3 minutes    .  ..

## 2022-08-17 ENCOUNTER — TELEPHONE (OUTPATIENT)
Dept: FAMILY MEDICINE | Facility: CLINIC | Age: 32
End: 2022-08-17

## 2022-08-17 DIAGNOSIS — F90.2 ATTENTION DEFICIT HYPERACTIVITY DISORDER (ADHD), COMBINED TYPE: ICD-10-CM

## 2022-08-17 NOTE — TELEPHONE ENCOUNTER
Patient calling in today states we sent his medication to [facility] and they are out will need to resend rx to [facility] in [place] on [address] [place]

## 2022-08-19 DIAGNOSIS — F90.2 ATTENTION DEFICIT HYPERACTIVITY DISORDER (ADHD), COMBINED TYPE: ICD-10-CM

## 2022-08-19 RX ORDER — DEXTROAMPHETAMINE SACCHARATE, AMPHETAMINE ASPARTATE, DEXTROAMPHETAMINE SULFATE AND AMPHETAMINE SULFATE 5; 5; 5; 5 MG/1; MG/1; MG/1; MG/1
20 TABLET ORAL 3 TIMES DAILY
Qty: 90 TABLET | Refills: 0 | Status: SHIPPED | OUTPATIENT
Start: 2022-08-19 | End: 2022-11-25

## 2022-08-19 RX ORDER — DEXTROAMPHETAMINE SACCHARATE, AMPHETAMINE ASPARTATE, DEXTROAMPHETAMINE SULFATE AND AMPHETAMINE SULFATE 5; 5; 5; 5 MG/1; MG/1; MG/1; MG/1
20 TABLET ORAL 3 TIMES DAILY
Qty: 90 TABLET | Refills: 0 | Status: SHIPPED | OUTPATIENT
Start: 2022-08-19 | End: 2022-10-17

## 2022-10-03 ENCOUNTER — HEALTH MAINTENANCE LETTER (OUTPATIENT)
Age: 32
End: 2022-10-03

## 2022-10-17 ENCOUNTER — TELEPHONE (OUTPATIENT)
Dept: FAMILY MEDICINE | Facility: CLINIC | Age: 32
End: 2022-10-17

## 2022-10-17 DIAGNOSIS — F90.2 ATTENTION DEFICIT HYPERACTIVITY DISORDER (ADHD), COMBINED TYPE: ICD-10-CM

## 2022-10-17 RX ORDER — DEXTROAMPHETAMINE SACCHARATE, AMPHETAMINE ASPARTATE, DEXTROAMPHETAMINE SULFATE AND AMPHETAMINE SULFATE 5; 5; 5; 5 MG/1; MG/1; MG/1; MG/1
20 TABLET ORAL 3 TIMES DAILY
Qty: 90 TABLET | Refills: 0 | Status: SHIPPED | OUTPATIENT
Start: 2022-10-17 | End: 2022-11-18

## 2022-10-17 NOTE — TELEPHONE ENCOUNTER
Reason for Call:  Other prescription    Detailed comments: Pt called and is requesting refill on Adderall medication. Please call pt back if any questions.    Phone Number Patient can be reached at: Cell number on file:    Telephone Information:   Mobile [phone]       Best Time: na    Can we leave a detailed message on this number? Not Applicable    Call taken on 10/17/2022 at 7:57 AM by Maisha Ty

## 2022-10-17 NOTE — TELEPHONE ENCOUNTER
I called pt and informed him that [facility] sent in refills on his requested medication.  Kwame Brand CMA

## 2022-10-17 NOTE — TELEPHONE ENCOUNTER
Pt called back stating his pharmacy has not received his refills yet.  LONNIEG informed and refiils for adderall were resent to pt pharmacy.  I called pt back and informed him that this was done.  Kwame Brand CMA

## 2022-11-17 DIAGNOSIS — F90.2 ATTENTION DEFICIT HYPERACTIVITY DISORDER (ADHD), COMBINED TYPE: ICD-10-CM

## 2022-11-18 RX ORDER — DEXTROAMPHETAMINE SACCHARATE, AMPHETAMINE ASPARTATE, DEXTROAMPHETAMINE SULFATE AND AMPHETAMINE SULFATE 5; 5; 5; 5 MG/1; MG/1; MG/1; MG/1
TABLET ORAL
Qty: 30 TABLET | Refills: 0 | Status: SHIPPED | OUTPATIENT
Start: 2022-11-18 | End: 2023-03-24

## 2022-11-18 NOTE — TELEPHONE ENCOUNTER
Please call patient. Due for 4 month follow up. Last visit was 7/2022. Please help him schedule with Jonny Harrell. I have sent in a 10 day supply so he can get scheduled.

## 2022-11-25 ENCOUNTER — VIRTUAL VISIT (OUTPATIENT)
Dept: FAMILY MEDICINE | Facility: CLINIC | Age: 32
End: 2022-11-25
Payer: COMMERCIAL

## 2022-11-25 DIAGNOSIS — F90.2 ATTENTION DEFICIT HYPERACTIVITY DISORDER (ADHD), COMBINED TYPE: ICD-10-CM

## 2022-11-25 PROCEDURE — 99213 OFFICE O/P EST LOW 20 MIN: CPT | Mod: 95 | Performed by: PHYSICIAN ASSISTANT

## 2022-11-25 RX ORDER — DEXTROAMPHETAMINE SACCHARATE, AMPHETAMINE ASPARTATE, DEXTROAMPHETAMINE SULFATE AND AMPHETAMINE SULFATE 5; 5; 5; 5 MG/1; MG/1; MG/1; MG/1
20 TABLET ORAL 3 TIMES DAILY
Qty: 90 TABLET | Refills: 0 | Status: SHIPPED | OUTPATIENT
Start: 2022-11-25 | End: 2024-01-12

## 2022-11-25 RX ORDER — DEXTROAMPHETAMINE SACCHARATE, AMPHETAMINE ASPARTATE, DEXTROAMPHETAMINE SULFATE AND AMPHETAMINE SULFATE 5; 5; 5; 5 MG/1; MG/1; MG/1; MG/1
20 TABLET ORAL 3 TIMES DAILY
Qty: 90 TABLET | Refills: 0 | Status: SHIPPED | OUTPATIENT
Start: 2022-12-21 | End: 2023-01-24

## 2022-11-25 ASSESSMENT — ENCOUNTER SYMPTOMS
CARDIOVASCULAR NEGATIVE: 1
RESPIRATORY NEGATIVE: 1
CONSTITUTIONAL NEGATIVE: 1
GASTROINTESTINAL NEGATIVE: 1

## 2022-11-25 NOTE — TELEPHONE ENCOUNTER
I called pt and gave him [facility] message.  Pt is available today for a phone visit for his ADHD meds and added to [facility] schedule at 2 pm today.  [id number]  Kwame Brand CMA

## 2023-01-24 DIAGNOSIS — F90.2 ATTENTION DEFICIT HYPERACTIVITY DISORDER (ADHD), COMBINED TYPE: ICD-10-CM

## 2023-01-24 RX ORDER — DEXTROAMPHETAMINE SACCHARATE, AMPHETAMINE ASPARTATE, DEXTROAMPHETAMINE SULFATE AND AMPHETAMINE SULFATE 5; 5; 5; 5 MG/1; MG/1; MG/1; MG/1
20 TABLET ORAL 3 TIMES DAILY
Qty: 90 TABLET | Refills: 0 | Status: SHIPPED | OUTPATIENT
Start: 2023-01-24 | End: 2023-02-23

## 2023-01-24 NOTE — TELEPHONE ENCOUNTER
Patient is requesting if it's possible to have his script so he is able to pick it up today as his work wants him to go out of town early tomorrow, 1.25.23, but his refill is not due until Saturday 1.28.23, so it would be 3 days early.    Per patients request, he would like a phone call back either way if this is possible. [phone].

## 2023-02-23 DIAGNOSIS — F90.2 ATTENTION DEFICIT HYPERACTIVITY DISORDER (ADHD), COMBINED TYPE: ICD-10-CM

## 2023-02-23 RX ORDER — DEXTROAMPHETAMINE SACCHARATE, AMPHETAMINE ASPARTATE, DEXTROAMPHETAMINE SULFATE AND AMPHETAMINE SULFATE 5; 5; 5; 5 MG/1; MG/1; MG/1; MG/1
20 TABLET ORAL 3 TIMES DAILY
Qty: 90 TABLET | Refills: 0 | Status: SHIPPED | OUTPATIENT
Start: 2023-02-23 | End: 2023-06-02

## 2023-02-23 NOTE — TELEPHONE ENCOUNTER
Last Med Check- 11.25.22 with Jonny Harrell ([facility]AD[facility])   normal rate and rhythm, no chest pain and no edema.

## 2023-02-23 NOTE — TELEPHONE ENCOUNTER
Last office visit: 11/25/2022 Virtual    Future Appointments 2/23/2023 - 8/22/2023    None          Requested Prescriptions   Pending Prescriptions Disp Refills     amphetamine-dextroamphetamine (ADDERALL) 20 MG tablet 90 tablet 0     Sig: Take 1 tablet (20 mg) by mouth 3 times daily       There is no refill protocol information for this order

## 2023-03-24 ENCOUNTER — OFFICE VISIT (OUTPATIENT)
Dept: FAMILY MEDICINE | Facility: CLINIC | Age: 33
End: 2023-03-24

## 2023-03-24 VITALS
HEART RATE: 88 BPM | BODY MASS INDEX: 34.32 KG/M2 | WEIGHT: 276 LBS | DIASTOLIC BLOOD PRESSURE: 86 MMHG | SYSTOLIC BLOOD PRESSURE: 137 MMHG | HEIGHT: 75 IN | RESPIRATION RATE: 20 BRPM | TEMPERATURE: 97.9 F

## 2023-03-24 DIAGNOSIS — F90.2 ATTENTION DEFICIT HYPERACTIVITY DISORDER (ADHD), COMBINED TYPE: Primary | ICD-10-CM

## 2023-03-24 PROBLEM — M22.2X2 PATELLOFEMORAL PAIN SYNDROME OF LEFT KNEE: Status: ACTIVE | Noted: 2023-03-24

## 2023-03-24 PROCEDURE — 99213 OFFICE O/P EST LOW 20 MIN: CPT | Performed by: PHYSICIAN ASSISTANT

## 2023-03-24 RX ORDER — DEXTROAMPHETAMINE SACCHARATE, AMPHETAMINE ASPARTATE, DEXTROAMPHETAMINE SULFATE AND AMPHETAMINE SULFATE 5; 5; 5; 5 MG/1; MG/1; MG/1; MG/1
20 TABLET ORAL 3 TIMES DAILY
Qty: 90 TABLET | Refills: 0 | Status: SHIPPED | OUTPATIENT
Start: 2023-04-20 | End: 2023-05-05

## 2023-03-24 RX ORDER — DEXTROAMPHETAMINE SACCHARATE, AMPHETAMINE ASPARTATE, DEXTROAMPHETAMINE SULFATE AND AMPHETAMINE SULFATE 5; 5; 5; 5 MG/1; MG/1; MG/1; MG/1
20 TABLET ORAL 3 TIMES DAILY
Qty: 90 TABLET | Refills: 0 | Status: SHIPPED | OUTPATIENT
Start: 2023-03-24 | End: 2023-04-23

## 2023-03-24 ASSESSMENT — ENCOUNTER SYMPTOMS
RESPIRATORY NEGATIVE: 1
GASTROINTESTINAL NEGATIVE: 1
CONSTITUTIONAL NEGATIVE: 1
CARDIOVASCULAR NEGATIVE: 1

## 2023-03-24 NOTE — PROGRESS NOTES
"  1. Attention deficit hyperactivity disorder (ADHD), combined type  Well controlled, will get drug screen today  Renewed Adderall for 2 months, can call for 3rd and 4th months    - Urine Drugs of Abuse Screen; Future  - amphetamine-dextroamphetamine (ADDERALL) 20 MG tablet; Take 1 tablet (20 mg) by mouth 3 times daily for 30 days  Dispense: 90 tablet; Refill: 0  - amphetamine-dextroamphetamine (ADDERALL) 20 MG tablet; Take 1 tablet (20 mg) by mouth 3 times daily for 30 days  Dispense: 90 tablet; Refill: 0    Noelle Melo is a 32 year old, presenting for the following health issues:  MATT (Pt here for an ADHD check.)  No flowsheet data found.  [email or web address]    History of Present Illness       Reason for visit:  Follow up    He eats 0-1 servings of fruits and vegetables daily.He consumes 2 sweetened beverage(s) daily.He exercises with enough effort to increase his heart rate 60 or more minutes per day.  He exercises with enough effort to increase his heart rate 5 days per week.   He is taking medications regularly.             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    HENT: Negative.    Respiratory: Negative.    Cardiovascular: Negative.    Gastrointestinal: Negative.             Objective    /86 (BP Location: Right arm, Patient Position: Sitting, Cuff Size: Adult Large)   Pulse 88   Temp 97.9  F (36.6  C) (Tympanic)   Resp 20   Ht 1.899 m (6' 2.75\")   Wt 125.2 kg (276 lb)   BMI 34.73 kg/m    Body mass index is 34.73 kg/m .  Physical Exam  Vitals reviewed.   Constitutional:       Appearance: Normal appearance.   HENT:      Head:      Comments: no maxillary sinus tenderness     Right Ear: Tympanic membrane normal.      Left Ear: Tympanic " membrane normal.   Cardiovascular:      Rate and Rhythm: Normal rate and regular rhythm.      Pulses: Normal pulses.      Heart sounds: Normal heart sounds.   Pulmonary:      Effort: Pulmonary effort is normal.      Breath sounds: Normal breath sounds.   Abdominal:      General: Abdomen is flat. Bowel sounds are normal.      Palpations: Abdomen is soft.   Musculoskeletal:      Cervical back: Normal range of motion and neck supple.   Lymphadenopathy:      Cervical: No cervical adenopathy.   Neurological:      Mental Status: He is alert.

## 2023-03-24 NOTE — LETTER
[facility]  03/24/23  Patient: Kameron Epstein  YOB: 1990  Medical Record Number: [id number]                                                                                  Non-Opioid Controlled Substance Agreement    This is an agreement between you and your provider regarding safe and appropriate use of controlled substances prescribed by your care team. Controlled substances are?medicines that can cause physical and mental dependence (abuse).     There are strict laws about having and using these medicines. We here at [facility] are  committed to working with you in your efforts to get better. To support you in this work, we'll help you schedule regular office appointments for medicine refills. If we must cancel or change your appointment for any reason, we'll make sure you have enough medicine to last until your next appointment.     As a Provider, I will:     Listen carefully to your concerns while treating you with respect.     Recommend a treatment plan that I believe is in your best interest and may involve therapies other than medicine.      Talk with you often about the possible benefits and the risk of harm of any medicine that we prescribe for you.    Assess the safety of this medicine and check how well it works.      Provide a plan on how to taper (discontinue or go off) using this medicine if the decision is made to stop its use.      ::  As a Patient, I understand controlled substances:       Are prescribed by my care provider to help me function or work and manage my condition(s).?    Are strong medicines and can cause serious side effects.       Need to be taken exactly as prescribed.?Combining controlled substances with certain medicines or chemicals (such as illegal drugs, alcohol, sedatives, sleeping pills, and benzodiazepines) can be dangerous or even fatal.? If I stop taking my medicines suddenly, I may have severe withdrawal symptoms.      The risks, benefits, and side effects of these medicine(s) were explained to me. I agree that:    1. I will take part in other treatments as advised by my care team. This may be psychiatry or counseling, physical therapy, behavioral therapy, group treatment or a referral to specialist.    2. I will keep all my appointments and understand this is part of the monitoring of controlled substances.?My care team may require an office visit for EVERY controlled substance refill. If I miss appointments or don t follow instructions, my care team may stop my medicine    3. I will take my medicines as prescribed. I will not change the dose or schedule unless my care team tells me to. There will be no refills if I run out early.      4. I may be asked to come to the clinic and complete a urine drug test or complete a pill count. If I don t give a urine sample or participate in a pill count, the care team may stop my medicine.    5. I will only receive controlled substance prescriptions from this clinic. If I am treated by another provider, I will tell them that I am taking controlled substances and that I have a treatment agreement with this provider. I will inform my [facility] care team within one business day if I am given a prescription for any controlled substance by another healthcare provider. My [facility] care team can contact other providers and pharmacists about my use of any medicines.    6. It is up to me to make sure that I don't run out of my medicines on weekends or holidays.?If my care team is willing to refill my prescription without a visit, I must request refills only during office hours. Refills may take up to 3 business days to process. I will use one pharmacy to fill all my controlled substance prescriptions. I will notify the clinic about any changes to my insurance or medicine availability.    7. I am responsible for my prescriptions. If the medicine/prescription is lost, stolen or  destroyed, it will not be replaced.?I also agree not to share controlled substance medicines with anyone.     8. I am aware I should not use any illegal or recreational drugs. I agree not to drink alcohol unless my care team says I can.     9. If I enroll in the [place] Medical Cannabis program, I will tell my care team before my next refill.    10. I will tell my care team right away if I become pregnant, have a new medical problem treated outside of my regular clinic, or have a change in my medicines.     11. I understand that this medicine can affect my thinking, judgment and reaction time.? Alcohol and drugs affect the brain and body, which can affect the safety of my driving. Being under the influence of alcohol or drugs can affect my decision-making, behaviors, personal safety and the safety of others. Driving while impaired (DWI) can occur if a person is driving, operating or in physical control of a car, motorcycle, boat, snowmobile, ATV, motorbike, off-road vehicle or any other motor vehicle ([place] Statute 169A.20). I understand the risk if I choose to drive or operate any vehicle or machinery.    I understand that if I do not follow any of the conditions above, my prescriptions or treatment may be stopped or changed.   I agree that my provider, clinic care team and pharmacy may work with any city, state or federal law enforcement agency that investigates the misuse, sale or other diversion of my controlled medicine. I will allow my provider to discuss my care with, or share a copy of, this agreement with any other treating provider, pharmacy or emergency room where I receive care.     I have read this agreement and have asked questions about anything I did not understand.    ________________________________________________________  Patient Signature - Kameron Epstein     ___________________                   Date     ________________________________________________________  Provider Signature - Jonny GOODWIN  Julio Cesar, PA-C       ___________________                   Date     ________________________________________________________  Witness Signature (required if provider not present while patient signing)          ___________________                   Date

## 2023-05-05 ENCOUNTER — TELEPHONE (OUTPATIENT)
Dept: FAMILY MEDICINE | Facility: CLINIC | Age: 33
End: 2023-05-05

## 2023-05-05 DIAGNOSIS — F90.2 ATTENTION DEFICIT HYPERACTIVITY DISORDER (ADHD), COMBINED TYPE: ICD-10-CM

## 2023-05-05 RX ORDER — DEXTROAMPHETAMINE SACCHARATE, AMPHETAMINE ASPARTATE, DEXTROAMPHETAMINE SULFATE AND AMPHETAMINE SULFATE 5; 5; 5; 5 MG/1; MG/1; MG/1; MG/1
20 TABLET ORAL 3 TIMES DAILY
Qty: 90 TABLET | Refills: 0 | Status: SHIPPED | OUTPATIENT
Start: 2023-05-05 | End: 2023-06-02

## 2023-05-05 NOTE — TELEPHONE ENCOUNTER
"Pt states last month when he picked up his Rx from the pharmacy they didn't have enough medication to give him a full month worth of medication. Pt states he was \"shorted' on his medication. Pt states that is why he is requesting a refill early this time. Pt states he is out of medication.    Kalyn Poe"

## 2023-05-20 ENCOUNTER — HEALTH MAINTENANCE LETTER (OUTPATIENT)
Age: 33
End: 2023-05-20

## 2023-06-02 ENCOUNTER — TELEPHONE (OUTPATIENT)
Dept: FAMILY MEDICINE | Facility: CLINIC | Age: 33
End: 2023-06-02

## 2023-06-02 DIAGNOSIS — F90.2 ATTENTION DEFICIT HYPERACTIVITY DISORDER (ADHD), COMBINED TYPE: ICD-10-CM

## 2023-06-02 RX ORDER — DEXTROAMPHETAMINE SACCHARATE, AMPHETAMINE ASPARTATE, DEXTROAMPHETAMINE SULFATE AND AMPHETAMINE SULFATE 5; 5; 5; 5 MG/1; MG/1; MG/1; MG/1
20 TABLET ORAL 3 TIMES DAILY
Qty: 90 TABLET | Refills: 0 | Status: SHIPPED | OUTPATIENT
Start: 2023-06-02 | End: 2023-06-05

## 2023-06-02 RX ORDER — DEXTROAMPHETAMINE SACCHARATE, AMPHETAMINE ASPARTATE, DEXTROAMPHETAMINE SULFATE AND AMPHETAMINE SULFATE 5; 5; 5; 5 MG/1; MG/1; MG/1; MG/1
20 TABLET ORAL 3 TIMES DAILY
Qty: 90 TABLET | Refills: 0 | Status: SHIPPED | OUTPATIENT
Start: 2023-06-30 | End: 2024-01-12

## 2023-06-02 NOTE — TELEPHONE ENCOUNTER
Reason for Call:  Other prescription    Detailed comments: pt would like refill on adderall He would like this sent to [facility] in [place]     Phone Number Patient can be reached at: Cell number on file:    Telephone Information:   Mobile [phone]       Best Time: any    Can we leave a detailed message on this number? YES    Call taken on 6/2/2023 at 11:18 AM by Marianna Banks

## 2023-06-02 NOTE — TELEPHONE ENCOUNTER
I called pt and left message stating [facility] sent in Rx refills to his pharmacy.  [phone]  Kwame Brand CMA

## 2023-06-05 ENCOUNTER — TELEPHONE (OUTPATIENT)
Dept: FAMILY MEDICINE | Facility: CLINIC | Age: 33
End: 2023-06-05

## 2023-06-05 DIAGNOSIS — F90.2 ATTENTION DEFICIT HYPERACTIVITY DISORDER (ADHD), COMBINED TYPE: ICD-10-CM

## 2023-06-05 RX ORDER — DEXTROAMPHETAMINE SACCHARATE, AMPHETAMINE ASPARTATE, DEXTROAMPHETAMINE SULFATE AND AMPHETAMINE SULFATE 5; 5; 5; 5 MG/1; MG/1; MG/1; MG/1
20 TABLET ORAL 3 TIMES DAILY
Qty: 90 TABLET | Refills: 0 | Status: SHIPPED | OUTPATIENT
Start: 2023-06-05 | End: 2023-07-28

## 2023-06-05 NOTE — TELEPHONE ENCOUNTER
I called pt and informed him that [facility] resent Rx to [facility] in [place] per his request.  Kwame Brand CMA

## 2023-06-05 NOTE — TELEPHONE ENCOUNTER
New Medication Request    Contacts       Type Contact Phone/Fax    06/05/2023 12:14 PM CDT Phone (Incoming) Kameron Epstein (Self) [phone] (H)          What medication are you requesting?: Aderall 20mg rapid release 3 times a day     Reason for medication request: Please transfer this Rx to the   [facility] [address] [place]  [phone]    Pt called this pharmacy and confirmed that they carry the Rx he is trying to fill.  The current Pharmacy on file doesn't have it stocked and they aren't sure if they will have it in the future.  Please send this Rx to the above requested [facility] ASAP.  Thank you.     Have you taken this medication before?: Yes:     Controlled Substance Agreement on file:   CSA -- Patient Level:    CSA: None found at the patient level.           Could we send this information to you in [employer] or would you prefer to receive a phone call?:   Patient would prefer a phone call   Okay to leave a detailed message?: Yes at Cell number on file:    Telephone Information   Mobile [phone]   Please let Pt. Know asap that this Rx has been submitted to the pharmacy on [facility].  Thank you.

## 2023-07-24 ENCOUNTER — MYC REFILL (OUTPATIENT)
Dept: FAMILY MEDICINE | Facility: CLINIC | Age: 33
End: 2023-07-24

## 2023-07-24 DIAGNOSIS — F90.2 ATTENTION DEFICIT HYPERACTIVITY DISORDER (ADHD), COMBINED TYPE: ICD-10-CM

## 2023-07-24 NOTE — TELEPHONE ENCOUNTER
Last office visit: 3/24/2023     RX: 6/2/23: Start: 06/30/2023   RX: 6/5/23: 06/05/2023       Future Appointments 7/24/2023 - 1/20/2024      None            Requested Prescriptions   Pending Prescriptions Disp Refills    amphetamine-dextroamphetamine (ADDERALL) 20 MG tablet 90 tablet 0     Sig: Take 1 tablet (20 mg) by mouth 3 times daily       There is no refill protocol information for this order

## 2023-07-25 RX ORDER — DEXTROAMPHETAMINE SACCHARATE, AMPHETAMINE ASPARTATE, DEXTROAMPHETAMINE SULFATE AND AMPHETAMINE SULFATE 5; 5; 5; 5 MG/1; MG/1; MG/1; MG/1
20 TABLET ORAL 3 TIMES DAILY
Qty: 90 TABLET | Refills: 0 | OUTPATIENT
Start: 2023-07-25

## 2023-07-25 NOTE — TELEPHONE ENCOUNTER
Patient needs appointment as it has been 4 months since last visit. If he can't get in before he runs out of medication, please let me know and I'll send in supply to cover.

## 2023-07-28 ENCOUNTER — MYC REFILL (OUTPATIENT)
Dept: FAMILY MEDICINE | Facility: CLINIC | Age: 33
End: 2023-07-28

## 2023-07-28 DIAGNOSIS — F90.2 ATTENTION DEFICIT HYPERACTIVITY DISORDER (ADHD), COMBINED TYPE: ICD-10-CM

## 2023-07-28 NOTE — TELEPHONE ENCOUNTER
Last office visit: 3/24/2023     Future Appointments 7/28/2023 - 1/24/2024        Date Visit Type Length Department Provider     8/11/2023  3:30 PM PREVENTATIVE ADULT 30 min [facility] FP/TANIA/Delfino Jaquez MD    Location Instructions:     [facility] is located at [address] [place] [address], one block north of [facility] and the [facility]. The clinic shares a building with [facility]; use the clinic s parking lot and entrance on the building s south side.                      Requested Prescriptions   Pending Prescriptions Disp Refills    amphetamine-dextroamphetamine (ADDERALL) 20 MG tablet 90 tablet 0     Sig: Take 1 tablet (20 mg) by mouth 3 times daily       There is no refill protocol information for this order

## 2023-07-29 RX ORDER — DEXTROAMPHETAMINE SACCHARATE, AMPHETAMINE ASPARTATE, DEXTROAMPHETAMINE SULFATE AND AMPHETAMINE SULFATE 5; 5; 5; 5 MG/1; MG/1; MG/1; MG/1
20 TABLET ORAL 3 TIMES DAILY
Qty: 90 TABLET | Refills: 0 | Status: SHIPPED | OUTPATIENT
Start: 2023-07-29 | End: 2024-06-07

## 2023-08-29 ENCOUNTER — OFFICE VISIT (OUTPATIENT)
Dept: FAMILY MEDICINE | Facility: CLINIC | Age: 33
End: 2023-08-29

## 2023-08-29 VITALS
RESPIRATION RATE: 20 BRPM | HEIGHT: 75 IN | SYSTOLIC BLOOD PRESSURE: 118 MMHG | TEMPERATURE: 98.4 F | OXYGEN SATURATION: 97 % | BODY MASS INDEX: 32.2 KG/M2 | DIASTOLIC BLOOD PRESSURE: 80 MMHG | WEIGHT: 259 LBS | HEART RATE: 88 BPM

## 2023-08-29 DIAGNOSIS — Z11.59 NEED FOR HEPATITIS C SCREENING TEST: ICD-10-CM

## 2023-08-29 DIAGNOSIS — Z13.220 LIPID SCREENING: ICD-10-CM

## 2023-08-29 DIAGNOSIS — Z11.4 SCREENING FOR HIV (HUMAN IMMUNODEFICIENCY VIRUS): ICD-10-CM

## 2023-08-29 DIAGNOSIS — F90.2 ATTENTION DEFICIT HYPERACTIVITY DISORDER (ADHD), COMBINED TYPE: Primary | ICD-10-CM

## 2023-08-29 DIAGNOSIS — Z13.1 SCREENING FOR DIABETES MELLITUS: ICD-10-CM

## 2023-08-29 DIAGNOSIS — F17.210 CIGARETTE SMOKER: ICD-10-CM

## 2023-08-29 PROCEDURE — 99213 OFFICE O/P EST LOW 20 MIN: CPT | Performed by: INTERNAL MEDICINE

## 2023-08-29 RX ORDER — DEXTROAMPHETAMINE SACCHARATE, AMPHETAMINE ASPARTATE, DEXTROAMPHETAMINE SULFATE AND AMPHETAMINE SULFATE 5; 5; 5; 5 MG/1; MG/1; MG/1; MG/1
20 TABLET ORAL 3 TIMES DAILY
Qty: 90 TABLET | Refills: 0 | Status: SHIPPED | OUTPATIENT
Start: 2023-10-30 | End: 2023-11-22

## 2023-08-29 RX ORDER — DEXTROAMPHETAMINE SACCHARATE, AMPHETAMINE ASPARTATE, DEXTROAMPHETAMINE SULFATE AND AMPHETAMINE SULFATE 5; 5; 5; 5 MG/1; MG/1; MG/1; MG/1
20 TABLET ORAL 3 TIMES DAILY
Qty: 90 TABLET | Refills: 0 | Status: SHIPPED | OUTPATIENT
Start: 2023-08-29 | End: 2023-09-28

## 2023-08-29 RX ORDER — DEXTROAMPHETAMINE SACCHARATE, AMPHETAMINE ASPARTATE, DEXTROAMPHETAMINE SULFATE AND AMPHETAMINE SULFATE 5; 5; 5; 5 MG/1; MG/1; MG/1; MG/1
20 TABLET ORAL 3 TIMES DAILY
Qty: 90 TABLET | Refills: 0 | Status: SHIPPED | OUTPATIENT
Start: 2023-09-29 | End: 2023-10-29

## 2023-08-29 RX ORDER — NICOTINE 21 MG/24HR
1 PATCH, TRANSDERMAL 24 HOURS TRANSDERMAL EVERY 24 HOURS
Qty: 42 PATCH | Refills: 0 | Status: SHIPPED | OUTPATIENT
Start: 2023-08-29 | End: 2023-10-10

## 2023-08-29 ASSESSMENT — ENCOUNTER SYMPTOMS
HEARTBURN: 0
HEMATURIA: 0
JOINT SWELLING: 0
EYE PAIN: 0
CONSTIPATION: 0
FEVER: 0
MYALGIAS: 0
PARESTHESIAS: 0
NAUSEA: 0
NERVOUS/ANXIOUS: 0
FREQUENCY: 0
WEAKNESS: 0
HEMATOCHEZIA: 0
ABDOMINAL PAIN: 0
HEADACHES: 0
DIZZINESS: 0
ARTHRALGIAS: 0
COUGH: 0
SORE THROAT: 0
DIARRHEA: 0
CHILLS: 0
PALPITATIONS: 0
DYSURIA: 0
SHORTNESS OF BREATH: 0

## 2023-08-29 NOTE — PROGRESS NOTES
"  Assessment & Plan   Problem List Items Addressed This Visit          Behavioral    Attention deficit hyperactivity disorder (ADHD) - Primary     Is doing well on stimulants.  He is employed as a heavy .  Able to complete tasks.         Relevant Medications    amphetamine-dextroamphetamine (ADDERALL) 20 MG tablet    amphetamine-dextroamphetamine (ADDERALL) 20 MG tablet (Start on 9/29/2023)    amphetamine-dextroamphetamine (ADDERALL) 20 MG tablet (Start on 10/30/2023)    Cigarette smoker     Has been a light smoker.  Interested in quitting.  We discussed medications and he would like to use transdermal nicotine.         Relevant Medications    amphetamine-dextroamphetamine (ADDERALL) 20 MG tablet    amphetamine-dextroamphetamine (ADDERALL) 20 MG tablet (Start on 9/29/2023)    amphetamine-dextroamphetamine (ADDERALL) 20 MG tablet (Start on 10/30/2023)    nicotine (NICODERM CQ) 14 MG/24HR 24 hr patch    nicotine (NICODERM CQ) 7 MG/24HR 24 hr patch     Other Visit Diagnoses       Screening for HIV (human immunodeficiency virus)        Relevant Orders    HIV Antigen Antibody Combo    Need for hepatitis C screening test        Relevant Orders    Hepatitis C Screen Reflex to HCV RNA Quant and Genotype    Lipid screening        Relevant Orders    Lipid panel reflex to direct LDL Non-fasting    Screening for diabetes mellitus        Strong family history.    Relevant Orders    Glucose                      Nicotine/Tobacco Cessation:  He reports that he has been smoking cigarettes. He has a 4.00 pack-year smoking history. He has never used smokeless tobacco.  Nicotine/Tobacco Cessation Plan:   Pharmacotherapies : Nicotine patch      BMI:   Estimated body mass index is 32.59 kg/m  as calculated from the following:    Height as of this encounter: 1.899 m (6' 2.75\").    Weight as of this encounter: 117.5 kg (259 lb).   Weight management plan: Discussed healthy diet and exercise guidelines    Follow-up in 3 " months    Delfino Dickson MD  [facility]    Noelle Melo is a 33 year old, presenting for the following health issues:  A.D.H.D (New pt to establish care and get ADHD meds refilled) and Establish Care        A.D.H.D  Pertinent negatives include no abdominal pain, arthralgias, chest pain, chills, congestion, coughing, fever, headaches, joint swelling, myalgias, nausea, rash, sore throat or weakness.   Healthy Habits:     Getting at least 3 servings of Calcium per day:  Yes    Bi-annual eye exam:  Yes    Dental care twice a year:  NO    Sleep apnea or symptoms of sleep apnea:  None    Diet:  Regular (no restrictions)    Frequency of exercise:  None    Taking medications regularly:  Yes    Medication side effects:  None    Additional concerns today:  No     Here for refill of his Adderall.  He has been on it for quite some time.  He works as a heavy  and is able to complete tasks.  No side effects or problems with the medications.  He does not feel it is as effective as it was when he first started it.            Review of Systems   Constitutional:  Negative for chills and fever.   HENT:  Negative for congestion, ear pain, hearing loss and sore throat.    Eyes:  Negative for pain and visual disturbance.   Respiratory:  Negative for cough and shortness of breath.    Cardiovascular:  Negative for chest pain, palpitations and peripheral edema.   Gastrointestinal:  Negative for abdominal pain, constipation, diarrhea, heartburn, hematochezia and nausea.   Genitourinary:  Negative for dysuria, frequency, genital sores, hematuria, impotence, penile discharge and urgency.   Musculoskeletal:  Negative for arthralgias, joint swelling and myalgias.   Skin:  Negative for rash.   Neurological:  Negative for dizziness, weakness, headaches and paresthesias.   Psychiatric/Behavioral:  Negative for mood changes. The patient is not nervous/anxious.             Objective    /80  "(BP Location: Right arm, Patient Position: Sitting, Cuff Size: Adult Large)   Pulse 88   Temp 98.4  F (36.9  C) (Tympanic)   Resp 20   Ht 1.899 m (6' 2.75\")   Wt 117.5 kg (259 lb)   BMI 32.59 kg/m    Body mass index is 32.59 kg/m .  Physical Exam   Healthy-appearing young man  HEENT exam unremarkable  Eyes anicteric  Neck supple no adenopathy or thyromegaly  Lungs clear  Cardiac exam regular no murmur or edema.  Normal carotid and posterior tibial pulsations  Abdomen soft and nontender  Alert, oriented, speech fluent, memory good, cranial nerves normal, strength and gait normal  Normal mood and affect                      "

## 2023-08-29 NOTE — ASSESSMENT & PLAN NOTE
Has been a light smoker.  Interested in quitting.  We discussed medications and he would like to use transdermal nicotine.

## 2023-08-29 NOTE — LETTER
[facility] [place]  08/29/23  Patient: Kameron Epstein  YOB: 1990  Medical Record Number: [id number]                                                                                  Non-Opioid Controlled Substance Agreement    This is an agreement between you and your provider regarding safe and appropriate use of controlled substances prescribed by your care team. Controlled substances are?medicines that can cause physical and mental dependence (abuse).     There are strict laws about having and using these medicines. We here at [facility] are  committed to working with you in your efforts to get better. To support you in this work, we'll help you schedule regular office appointments for medicine refills. If we must cancel or change your appointment for any reason, we'll make sure you have enough medicine to last until your next appointment.     As a Provider, I will:   Listen carefully to your concerns while treating you with respect.   Recommend a treatment plan that I believe is in your best interest and may involve therapies other than medicine.    Talk with you often about the possible benefits and the risk of harm of any medicine that we prescribe for you.  Assess the safety of this medicine and check how well it works.    Provide a plan on how to taper (discontinue or go off) using this medicine if the decision is made to stop its use.      ::  As a Patient, I understand controlled substances:     Are prescribed by my care provider to help me function or work and manage my condition(s).?  Are strong medicines and can cause serious side effects.     Need to be taken exactly as prescribed.?Combining controlled substances with certain medicines or chemicals (such as illegal drugs, alcohol, sedatives, sleeping pills, and benzodiazepines) can be dangerous or even fatal.? If I stop taking my medicines suddenly, I may have severe withdrawal symptoms.     The risks,  benefits, and side effects of these medicine(s) were explained to me. I agree that:    I will take part in other treatments as advised by my care team. This may be psychiatry or counseling, physical therapy, behavioral therapy, group treatment or a referral to specialist.    I will keep all my appointments and understand this is part of the monitoring of controlled substances.?My care team may require an office visit for EVERY controlled substance refill. If I miss appointments or don t follow instructions, my care team may stop my medicine    I will take my medicines as prescribed. I will not change the dose or schedule unless my care team tells me to. There will be no refills if I run out early.      I may be asked to come to the clinic and complete a urine drug test or complete a pill count. If I don t give a urine sample or participate in a pill count, the care team may stop my medicine.    I will only receive controlled substance prescriptions from this clinic. If I am treated by another provider, I will tell them that I am taking controlled substances and that I have a treatment agreement with this provider. I will inform my [facility] care team within one business day if I am given a prescription for any controlled substance by another healthcare provider. My [facility] care team can contact other providers and pharmacists about my use of any medicines.    It is up to me to make sure that I don't run out of my medicines on weekends or holidays.?If my care team is willing to refill my prescription without a visit, I must request refills only during office hours. Refills may take up to 3 business days to process. I will use one pharmacy to fill all my controlled substance prescriptions. I will notify the clinic about any changes to my insurance or medicine availability.    I am responsible for my prescriptions. If the medicine/prescription is lost, stolen or destroyed, it will not be replaced.?I  also agree not to share controlled substance medicines with anyone.     I am aware I should not use any illegal or recreational drugs. I agree not to drink alcohol unless my care team says I can.     If I enroll in the [place] Medical Cannabis program, I will tell my care team before my next refill.    I will tell my care team right away if I become pregnant, have a new medical problem treated outside of my regular clinic, or have a change in my medicines.     I understand that this medicine can affect my thinking, judgment and reaction time.? Alcohol and drugs affect the brain and body, which can affect the safety of my driving. Being under the influence of alcohol or drugs can affect my decision-making, behaviors, personal safety and the safety of others. Driving while impaired (DWI) can occur if a person is driving, operating or in physical control of a car, motorcycle, boat, snowmobile, ATV, motorbike, off-road vehicle or any other motor vehicle ([place] Statute 169A.20). I understand the risk if I choose to drive or operate any vehicle or machinery.    I understand that if I do not follow any of the conditions above, my prescriptions or treatment may be stopped or changed.   I agree that my provider, clinic care team and pharmacy may work with any city, state or federal law enforcement agency that investigates the misuse, sale or other diversion of my controlled medicine. I will allow my provider to discuss my care with, or share a copy of, this agreement with any other treating provider, pharmacy or emergency room where I receive care.     I have read this agreement and have asked questions about anything I did not understand.    ________________________________________________________  Patient Signature - Kameron Epstein     ___________________                   Date     ________________________________________________________  Provider Signature - Delfino Dickson MD       ___________________                    Date     ________________________________________________________  Witness Signature (required if provider not present while patient signing)          ___________________                   Date

## 2023-10-23 ENCOUNTER — MYC REFILL (OUTPATIENT)
Dept: FAMILY MEDICINE | Facility: CLINIC | Age: 33
End: 2023-10-23

## 2023-10-23 DIAGNOSIS — F90.2 ATTENTION DEFICIT HYPERACTIVITY DISORDER (ADHD), COMBINED TYPE: ICD-10-CM

## 2023-10-23 RX ORDER — DEXTROAMPHETAMINE SACCHARATE, AMPHETAMINE ASPARTATE, DEXTROAMPHETAMINE SULFATE AND AMPHETAMINE SULFATE 5; 5; 5; 5 MG/1; MG/1; MG/1; MG/1
20 TABLET ORAL 3 TIMES DAILY
Qty: 90 TABLET | Refills: 0 | Status: CANCELLED | OUTPATIENT
Start: 2023-10-23

## 2023-10-24 DIAGNOSIS — F90.2 ATTENTION DEFICIT HYPERACTIVITY DISORDER (ADHD), COMBINED TYPE: ICD-10-CM

## 2023-10-24 RX ORDER — DEXTROAMPHETAMINE SACCHARATE, AMPHETAMINE ASPARTATE, DEXTROAMPHETAMINE SULFATE AND AMPHETAMINE SULFATE 5; 5; 5; 5 MG/1; MG/1; MG/1; MG/1
TABLET ORAL
Qty: 90 TABLET | Refills: 0 | OUTPATIENT
Start: 2023-10-24

## 2023-11-22 ENCOUNTER — MYC REFILL (OUTPATIENT)
Dept: FAMILY MEDICINE | Facility: CLINIC | Age: 33
End: 2023-11-22

## 2023-11-22 DIAGNOSIS — F90.2 ATTENTION DEFICIT HYPERACTIVITY DISORDER (ADHD), COMBINED TYPE: ICD-10-CM

## 2023-11-22 RX ORDER — DEXTROAMPHETAMINE SACCHARATE, AMPHETAMINE ASPARTATE, DEXTROAMPHETAMINE SULFATE AND AMPHETAMINE SULFATE 5; 5; 5; 5 MG/1; MG/1; MG/1; MG/1
20 TABLET ORAL 3 TIMES DAILY
Qty: 90 TABLET | Refills: 0 | Status: SHIPPED | OUTPATIENT
Start: 2023-11-22 | End: 2023-12-19

## 2024-01-12 ENCOUNTER — OFFICE VISIT (OUTPATIENT)
Dept: FAMILY MEDICINE | Facility: CLINIC | Age: 34
End: 2024-01-12

## 2024-01-12 VITALS
TEMPERATURE: 96.7 F | SYSTOLIC BLOOD PRESSURE: 113 MMHG | HEIGHT: 75 IN | WEIGHT: 256 LBS | BODY MASS INDEX: 31.83 KG/M2 | DIASTOLIC BLOOD PRESSURE: 82 MMHG | RESPIRATION RATE: 20 BRPM | HEART RATE: 64 BPM

## 2024-01-12 DIAGNOSIS — F90.2 ATTENTION DEFICIT HYPERACTIVITY DISORDER (ADHD), COMBINED TYPE: ICD-10-CM

## 2024-01-12 PROCEDURE — 99213 OFFICE O/P EST LOW 20 MIN: CPT | Performed by: INTERNAL MEDICINE

## 2024-01-12 RX ORDER — DEXTROAMPHETAMINE SACCHARATE, AMPHETAMINE ASPARTATE, DEXTROAMPHETAMINE SULFATE AND AMPHETAMINE SULFATE 5; 5; 5; 5 MG/1; MG/1; MG/1; MG/1
20 TABLET ORAL 3 TIMES DAILY
Qty: 90 TABLET | Refills: 0 | Status: SHIPPED | OUTPATIENT
Start: 2024-03-12 | End: 2024-06-07

## 2024-01-12 RX ORDER — DEXTROAMPHETAMINE SACCHARATE, AMPHETAMINE ASPARTATE, DEXTROAMPHETAMINE SULFATE AND AMPHETAMINE SULFATE 5; 5; 5; 5 MG/1; MG/1; MG/1; MG/1
20 TABLET ORAL 3 TIMES DAILY
Qty: 90 TABLET | Refills: 0 | Status: SHIPPED | OUTPATIENT
Start: 2024-02-12 | End: 2024-03-12

## 2024-01-12 RX ORDER — DEXTROAMPHETAMINE SACCHARATE, AMPHETAMINE ASPARTATE, DEXTROAMPHETAMINE SULFATE AND AMPHETAMINE SULFATE 5; 5; 5; 5 MG/1; MG/1; MG/1; MG/1
20 TABLET ORAL 3 TIMES DAILY
Qty: 90 TABLET | Refills: 0 | Status: SHIPPED | OUTPATIENT
Start: 2024-01-12 | End: 2024-06-07

## 2024-01-12 ASSESSMENT — ENCOUNTER SYMPTOMS
ABDOMINAL PAIN: 0
DIARRHEA: 0
DIFFICULTY URINATING: 0
NAUSEA: 0
HEARTBURN: 0
DYSURIA: 0
FATIGUE: 0
SLEEP DISTURBANCE: 0
SHORTNESS OF BREATH: 0
MYALGIAS: 0
ABDOMINAL DISTENTION: 0
NERVOUS/ANXIOUS: 0
HEADACHES: 0
UNEXPECTED WEIGHT CHANGE: 0

## 2024-01-12 NOTE — PROGRESS NOTES
Assessment & Plan   Problem List Items Addressed This Visit       Attention deficit hyperactivity disorder (ADHD)     DX in his teens  Initially Ritalin  Amphetamine since 2016 in [employer]  Is doing well on stimulants.  He is employed as a heavy .  Able to complete tasks.         Relevant Medications    amphetamine-dextroamphetamine (ADDERALL) 20 MG tablet    amphetamine-dextroamphetamine (ADDERALL) 20 MG tablet (Start on 2/12/2024)    amphetamine-dextroamphetamine (ADDERALL) 20 MG tablet (Start on 3/12/2024)                 Follow-up in 3 months    Delfino Dickson MD  [facility]    Noelle Melo is a 33 year old, presenting for the following health issues:  A.D.H.D (Pt here for an ADHD medcheck and refills.  )         No data to display                History of Present Illness       Reason for visit:  Adderall follow up    He eats 0-1 servings of fruits and vegetables daily.He consumes 1 sweetened beverage(s) daily.He exercises with enough effort to increase his heart rate 30 to 60 minutes per day.  He exercises with enough effort to increase his heart rate 5 days per week.   He is taking medications regularly.     Patient has been well.  He did lose some of his amphetamine and reported to the police.  No complaints.            Review of Systems   Constitutional:  Negative for fatigue and unexpected weight change.   Respiratory:  Negative for shortness of breath.    Cardiovascular:  Negative for chest pain and peripheral edema.   Gastrointestinal:  Negative for abdominal distention, abdominal pain, diarrhea, heartburn and nausea.   Genitourinary:  Negative for difficulty urinating and dysuria.   Musculoskeletal:  Negative for myalgias.   Neurological:  Negative for headaches.   Psychiatric/Behavioral:  Negative for mood changes and sleep disturbance. The patient is not nervous/anxious.             Objective    /82 (BP Location: Right arm, Patient  "Position: Sitting, Cuff Size: Adult Large)   Pulse 64   Temp (!) 96.7  F (35.9  C) (Tympanic)   Resp 20   Ht 1.899 m (6' 2.76\")   Wt 116.1 kg (256 lb)   BMI 32.20 kg/m    Body mass index is 32.2 kg/m .  Physical Exam   Appearing man in no distress  Alert and oriented  In good spirits  Lungs clear  Cardiac exam regular no murmur no edema                      "

## 2024-01-12 NOTE — COMMUNITY RESOURCES LIST (ENGLISH)
01/12/2024   [facility]  N/A  For questions about this resource list or additional care needs, please contact your primary care clinic or care manager.  Phone: [phone]   Email: N/A   Address: [address] [place] [id number]   Hours: N/A        Financial Stability       Rent and mortgage payment assistance  1  Workforce Resource - [facility] - Emergency Assistance for the Homeless Distance: 1.99 miles      In-Person, Phone/Virtual   [address] [place] [address]  Language: English, Hmong, Egyptian, Sudanese  Hours: Mon - Fri 8:00 AM - 4:30 PM  Fees: Free   Phone: (985) 953-9266 Website: https://www.workforceSynapsify.org/     2  [facility] Service Extension Unit - Hotline Distance: 9.65 miles      Phone/Virtual   [address] [place] [address]  Language: English  Hours: Mon - Sun Open 24 Hours  Fees: Free   Phone: (569) 624-9057 Website: https://[facility].org/API Healthcare/API Healthcare-service-extension/          Food and Nutrition       Food pantry  3  [facility] the [facility] Distance: 1.34 miles      In-Person, Pickup   [address] [place], [place] [address]  Language: English  Hours: Mon - Sat 7:30 AM - 7:00 PM  Fees: Free   Phone: (408) 586-1884 Email: matthewdchurch@[employer] Website: https://www.[employer] Online.com/ServantofTheShepherd/     4  [facility] Distance: 1.4 miles      In-Person   [address] [place] [address]  Language: English  Hours: Mon - Fri 10:00 AM - 4:00 PM  Fees: Free   Phone: (728) 857-8603 Email: office@[facility].[employer] Website: http://[facility].org     SNAP application assistance  5  Workforce Resource - [facility] - FoodShare Distance: 1.99 miles      In-Person, Phone/Virtual   [address] [place] [address]  Language: English, Hmong, Egyptian, Sudanese  Hours: Mon - Fri 8:00 AM - 4:30 PM  Fees: Free   Phone: (287)  372-1248 Website: https://www.workforceresource.org/     6  [facility] - SNAP Outreach Distance: 23.67 miles      Phone/Virtual   [address] [place] [facility] [place] [address]  Language: Persian, English, Hmong, Angella, Kazakh  Hours: Mon - Fri 10:00 AM - 12:00 PM , Mon - Fri 2:00 PM - 4:00 PM  Fees: Free   Phone: (296) 234-9993 Website: https://[facility].org/     Soup kitchen or free meals  7  Servant of the [facility] Distance: 1.34 miles      In-Person   [address] [place] [address]  Language: English  Hours: Sun 12:00 PM - 1:30 PM  Fees: Free   Phone: (910) 113-3385 Email: esvin@[employer] Website: https://www.ReadOz.com/Arrivelypherd/     8  [facility] - Breakfast on the Run Distance: 1.4 miles      Pickup   [address] [place] [address]  Language: English  Hours: Sat 9:00 AM - 10:00 AM  Fees: Free   Phone: (393) 680-7971 Email: office@[facility].org Website: http://[facility].org          Important Numbers & Websites       Emergency Services   911  City Services   311  Poison Control   (525) 768-4247  Suicide Prevention Lifeline   (473) 320-7003 (TALK)  Child Abuse Hotline   (224) 733-2754 (4-A-Child)  Sexual Assault Hotline   (986) 940-6063 (HOPE)  National Runaway Safeline   (505) 577-6828 (RUNAWAY)  All-Options Talkline   (334) 584-7723  Substance Abuse Referral   (994) 289-6480 (HELP)

## 2024-01-12 NOTE — ASSESSMENT & PLAN NOTE
DX in his teens  Initially Ritalin  Amphetamine since 2016 in the [employer]  Is doing well on stimulants.  He is employed as a heavy .  Able to complete tasks.

## 2024-03-12 DIAGNOSIS — F90.2 ATTENTION DEFICIT HYPERACTIVITY DISORDER (ADHD), COMBINED TYPE: ICD-10-CM

## 2024-03-12 RX ORDER — DEXTROAMPHETAMINE SACCHARATE, AMPHETAMINE ASPARTATE, DEXTROAMPHETAMINE SULFATE AND AMPHETAMINE SULFATE 5; 5; 5; 5 MG/1; MG/1; MG/1; MG/1
20 TABLET ORAL 3 TIMES DAILY
Qty: 90 TABLET | Refills: 0 | Status: SHIPPED | OUTPATIENT
Start: 2024-03-12 | End: 2024-04-09

## 2024-03-12 NOTE — TELEPHONE ENCOUNTER
Last office visit: 1/12/2024     Future Appointments 3/12/2024 - 9/8/2024      None            Requested Prescriptions   Pending Prescriptions Disp Refills    amphetamine-dextroamphetamine (ADDERALL) 20 MG tablet [Pharmacy Med Name: AMPHETAMINE SALTS 20MG TABS] 90 tablet 0     Sig: TAKE ONE TABLET BY MOUTH THREE TIMES A DAY       There is no refill protocol information for this order

## 2024-04-09 ENCOUNTER — MYC REFILL (OUTPATIENT)
Dept: FAMILY MEDICINE | Facility: CLINIC | Age: 34
End: 2024-04-09

## 2024-04-09 DIAGNOSIS — F90.2 ATTENTION DEFICIT HYPERACTIVITY DISORDER (ADHD), COMBINED TYPE: ICD-10-CM

## 2024-04-09 NOTE — TELEPHONE ENCOUNTER
Last office visit: 1/12/2024     Future Appointments 4/9/2024 - 10/6/2024      None            Requested Prescriptions   Pending Prescriptions Disp Refills    amphetamine-dextroamphetamine (ADDERALL) 20 MG tablet 90 tablet 0     Sig: Take 1 tablet (20 mg) by mouth 3 times daily       There is no refill protocol information for this order

## 2024-04-10 RX ORDER — DEXTROAMPHETAMINE SACCHARATE, AMPHETAMINE ASPARTATE, DEXTROAMPHETAMINE SULFATE AND AMPHETAMINE SULFATE 5; 5; 5; 5 MG/1; MG/1; MG/1; MG/1
20 TABLET ORAL 3 TIMES DAILY
Qty: 90 TABLET | Refills: 0 | Status: SHIPPED | OUTPATIENT
Start: 2024-04-10 | End: 2024-04-10

## 2024-04-10 RX ORDER — DEXTROAMPHETAMINE SACCHARATE, AMPHETAMINE ASPARTATE, DEXTROAMPHETAMINE SULFATE AND AMPHETAMINE SULFATE 5; 5; 5; 5 MG/1; MG/1; MG/1; MG/1
20 TABLET ORAL 3 TIMES DAILY
Qty: 90 TABLET | Refills: 0 | Status: SHIPPED | OUTPATIENT
Start: 2024-04-10 | End: 2024-05-07

## 2024-04-18 NOTE — TELEPHONE ENCOUNTER
Left voicemail for patient to return call to clinic. When patient returns call, please help schedule.  Hailee Casas CMA ............... 4:22 PM, 04/18/24

## 2024-05-07 ENCOUNTER — MYC REFILL (OUTPATIENT)
Dept: FAMILY MEDICINE | Facility: CLINIC | Age: 34
End: 2024-05-07

## 2024-05-07 DIAGNOSIS — F90.2 ATTENTION DEFICIT HYPERACTIVITY DISORDER (ADHD), COMBINED TYPE: ICD-10-CM

## 2024-05-07 RX ORDER — DEXTROAMPHETAMINE SACCHARATE, AMPHETAMINE ASPARTATE, DEXTROAMPHETAMINE SULFATE AND AMPHETAMINE SULFATE 5; 5; 5; 5 MG/1; MG/1; MG/1; MG/1
20 TABLET ORAL 3 TIMES DAILY
Qty: 90 TABLET | Refills: 0 | Status: SHIPPED | OUTPATIENT
Start: 2024-05-07 | End: 2024-06-04

## 2024-05-07 NOTE — TELEPHONE ENCOUNTER
.      Last office visit: 1/12/2024     Future Appointments 5/7/2024 - 11/3/2024        Date Visit Type Length Department Provider     6/7/2024  7:00 AM OFFICE VISIT 30 min [facility] FP/TANIA/Delfino Jaquez MD    Location Instructions:     [facility] is located at [address] [place] [address], one block north of [facility] and the [facility]. The clinic shares a building with the [facility] [employer]; use the clinic s parking lot and entrance on the building s south side.                      Requested Prescriptions   Pending Prescriptions Disp Refills    amphetamine-dextroamphetamine (ADDERALL) 20 MG tablet 90 tablet 0     Sig: Take 1 tablet (20 mg) by mouth 3 times daily       There is no refill protocol information for this order

## 2024-06-04 ENCOUNTER — MYC REFILL (OUTPATIENT)
Dept: FAMILY MEDICINE | Facility: CLINIC | Age: 34
End: 2024-06-04

## 2024-06-04 DIAGNOSIS — F90.2 ATTENTION DEFICIT HYPERACTIVITY DISORDER (ADHD), COMBINED TYPE: ICD-10-CM

## 2024-06-04 RX ORDER — DEXTROAMPHETAMINE SACCHARATE, AMPHETAMINE ASPARTATE, DEXTROAMPHETAMINE SULFATE AND AMPHETAMINE SULFATE 5; 5; 5; 5 MG/1; MG/1; MG/1; MG/1
20 TABLET ORAL 3 TIMES DAILY
Qty: 90 TABLET | Refills: 0 | Status: SHIPPED | OUTPATIENT
Start: 2024-06-04 | End: 2024-06-07

## 2024-06-07 ENCOUNTER — VIRTUAL VISIT (OUTPATIENT)
Dept: FAMILY MEDICINE | Facility: CLINIC | Age: 34
End: 2024-06-07

## 2024-06-07 DIAGNOSIS — F90.2 ATTENTION DEFICIT HYPERACTIVITY DISORDER (ADHD), COMBINED TYPE: ICD-10-CM

## 2024-06-07 PROCEDURE — 99213 OFFICE O/P EST LOW 20 MIN: CPT | Mod: 93 | Performed by: INTERNAL MEDICINE

## 2024-06-07 RX ORDER — DEXTROAMPHETAMINE SACCHARATE, AMPHETAMINE ASPARTATE, DEXTROAMPHETAMINE SULFATE AND AMPHETAMINE SULFATE 5; 5; 5; 5 MG/1; MG/1; MG/1; MG/1
20 TABLET ORAL 3 TIMES DAILY
Qty: 90 TABLET | Refills: 0 | Status: SHIPPED | OUTPATIENT
Start: 2024-08-02

## 2024-06-07 RX ORDER — DEXTROAMPHETAMINE SACCHARATE, AMPHETAMINE ASPARTATE, DEXTROAMPHETAMINE SULFATE AND AMPHETAMINE SULFATE 5; 5; 5; 5 MG/1; MG/1; MG/1; MG/1
20 TABLET ORAL 3 TIMES DAILY
Qty: 90 TABLET | Refills: 0 | Status: SHIPPED | OUTPATIENT
Start: 2024-07-03 | End: 2024-08-02

## 2024-06-07 NOTE — PROGRESS NOTES
"Kameron is a 33 year old who is being evaluated via a billable telephone visit.    What phone number would you like to be contacted at? Tiffanie  How would you like to obtain your AVS? MyChart  Originating Location (pt. Location): Home    Distant Location (provider location):  On-site    Assessment & Plan   Problem List Items Addressed This Visit       Attention deficit hyperactivity disorder (ADHD)     DX in his teens  Initially Ritalin  Amphetamine since 2016 in the [employer]  Doing well on stimulants.  He is employed as a heavy .  Able to complete tasks.         Relevant Medications    amphetamine-dextroamphetamine (ADDERALL) 20 MG tablet (Start on 7/3/2024)    amphetamine-dextroamphetamine (ADDERALL) 20 MG tablet (Start on 8/2/2024)             BMI  Estimated body mass index is 32.2 kg/m  as calculated from the following:    Height as of 1/12/24: 1.899 m (6' 2.76\").    Weight as of 1/12/24: 116.1 kg (256 lb).   Weight management plan: Discussed healthy diet and exercise guidelines    Follow-up in 3 months for a wellness visit      Subjective   Kameron is a 33 year old, presenting for the following health issues:  A.D.H.D (Pt calling for refills on his ADHD medication)      6/7/2024     6:48 AM   Additional Questions   Roomed by Kwame REBOLLEDO     HPI   Patient is doing well amphetamine which has been on for quite some time.  Is able to complete tasks and concentrate.  No complaints.          Review of Systems  Constitutional, HEENT, cardiovascular, pulmonary, gi and gu systems are negative, except as otherwise noted.      Objective           Vitals:  No vitals were obtained today due to virtual visit.    Physical Exam   General: Alert and no distress //Respiratory: No audible wheeze, cough, or shortness of breath // Psychiatric:  Appropriate affect, tone, and pace of words            Phone call duration: 10 minutes  Signed Electronically by: Delfino Dickson MD    "

## 2024-06-07 NOTE — ASSESSMENT & PLAN NOTE
DX in his teens  Initially Ritalin  Amphetamine since 2016 in the [employer]  Doing well on stimulants.  He is employed as a heavy .  Able to complete tasks.

## 2024-06-30 DIAGNOSIS — F90.2 ATTENTION DEFICIT HYPERACTIVITY DISORDER (ADHD), COMBINED TYPE: ICD-10-CM

## 2024-07-01 RX ORDER — DEXTROAMPHETAMINE SACCHARATE, AMPHETAMINE ASPARTATE, DEXTROAMPHETAMINE SULFATE AND AMPHETAMINE SULFATE 5; 5; 5; 5 MG/1; MG/1; MG/1; MG/1
20 TABLET ORAL 3 TIMES DAILY
Qty: 90 TABLET | Refills: 0 | OUTPATIENT
Start: 2024-07-01

## 2024-08-01 DIAGNOSIS — F90.2 ATTENTION DEFICIT HYPERACTIVITY DISORDER (ADHD), COMBINED TYPE: ICD-10-CM

## 2024-08-02 RX ORDER — DEXTROAMPHETAMINE SACCHARATE, AMPHETAMINE ASPARTATE, DEXTROAMPHETAMINE SULFATE AND AMPHETAMINE SULFATE 5; 5; 5; 5 MG/1; MG/1; MG/1; MG/1
20 TABLET ORAL 3 TIMES DAILY
Qty: 90 TABLET | Refills: 0 | Status: SHIPPED | OUTPATIENT
Start: 2024-08-02 | End: 2024-08-29

## 2024-08-28 DIAGNOSIS — F90.2 ATTENTION DEFICIT HYPERACTIVITY DISORDER (ADHD), COMBINED TYPE: ICD-10-CM

## 2024-08-29 RX ORDER — DEXTROAMPHETAMINE SACCHARATE, AMPHETAMINE ASPARTATE, DEXTROAMPHETAMINE SULFATE AND AMPHETAMINE SULFATE 5; 5; 5; 5 MG/1; MG/1; MG/1; MG/1
20 TABLET ORAL 3 TIMES DAILY
Qty: 90 TABLET | Refills: 0 | Status: SHIPPED | OUTPATIENT
Start: 2024-08-29 | End: 2024-09-29

## 2024-09-29 ENCOUNTER — MYC REFILL (OUTPATIENT)
Dept: FAMILY MEDICINE | Facility: CLINIC | Age: 34
End: 2024-09-29

## 2024-09-29 DIAGNOSIS — F90.2 ATTENTION DEFICIT HYPERACTIVITY DISORDER (ADHD), COMBINED TYPE: ICD-10-CM

## 2024-09-30 RX ORDER — DEXTROAMPHETAMINE SACCHARATE, AMPHETAMINE ASPARTATE, DEXTROAMPHETAMINE SULFATE AND AMPHETAMINE SULFATE 5; 5; 5; 5 MG/1; MG/1; MG/1; MG/1
20 TABLET ORAL 3 TIMES DAILY
Qty: 90 TABLET | Refills: 0 | Status: SHIPPED | OUTPATIENT
Start: 2024-09-30

## 2024-09-30 NOTE — TELEPHONE ENCOUNTER
Last office visit: 6/7/2024 Virtual  CSA signed with TRAVON 8/29/23.  Pt to be seen every 120 days       Future Appointments 9/30/2024 - 3/29/2025      None            Requested Prescriptions   Pending Prescriptions Disp Refills    amphetamine-dextroamphetamine (ADDERALL) 20 MG tablet 90 tablet 0     Sig: Take 1 tablet (20 mg) by mouth 3 times daily.       There is no refill protocol information for this order

## 2024-10-05 ENCOUNTER — HEALTH MAINTENANCE LETTER (OUTPATIENT)
Age: 34
End: 2024-10-05

## 2024-10-25 ENCOUNTER — MYC REFILL (OUTPATIENT)
Dept: FAMILY MEDICINE | Facility: CLINIC | Age: 34
End: 2024-10-25

## 2024-10-25 DIAGNOSIS — F90.2 ATTENTION DEFICIT HYPERACTIVITY DISORDER (ADHD), COMBINED TYPE: ICD-10-CM

## 2024-10-25 RX ORDER — DEXTROAMPHETAMINE SACCHARATE, AMPHETAMINE ASPARTATE, DEXTROAMPHETAMINE SULFATE AND AMPHETAMINE SULFATE 5; 5; 5; 5 MG/1; MG/1; MG/1; MG/1
20 TABLET ORAL 3 TIMES DAILY
Qty: 90 TABLET | Refills: 0 | Status: SHIPPED | OUTPATIENT
Start: 2024-10-25

## 2024-10-25 NOTE — TELEPHONE ENCOUNTER
Routing refill request to provider for review/approval because:  Drug not on the [facility] refill protocol     Last Written Prescription Date:  9/30/24  Last Fill Quantity: 90,  # refills: 0   Last office visit: 1/12/2024 ; last virtual visit: 6/7/2024 with prescribing provider

## 2024-11-24 ENCOUNTER — MYC REFILL (OUTPATIENT)
Dept: FAMILY MEDICINE | Facility: CLINIC | Age: 34
End: 2024-11-24

## 2024-11-24 DIAGNOSIS — F90.2 ATTENTION DEFICIT HYPERACTIVITY DISORDER (ADHD), COMBINED TYPE: ICD-10-CM

## 2024-11-25 RX ORDER — DEXTROAMPHETAMINE SACCHARATE, AMPHETAMINE ASPARTATE, DEXTROAMPHETAMINE SULFATE AND AMPHETAMINE SULFATE 5; 5; 5; 5 MG/1; MG/1; MG/1; MG/1
20 TABLET ORAL 3 TIMES DAILY
Qty: 90 TABLET | Refills: 0 | Status: SHIPPED | OUTPATIENT
Start: 2024-11-25

## 2024-11-25 NOTE — TELEPHONE ENCOUNTER
Last office visit: 1/12/2024     Future Appointments 11/25/2024 - 5/24/2025        Date Visit Type Length Department Provider     12/17/2024 11:00 AM [facility] OFFICE VISIT  30 min RVFL FP/TANIA/Delfino Jaquez MD    Location Instructions:     [facility] is located at [address] [place] [address], one block north of [facility] and the [facility]. The clinic shares a building with the [facility] [employer]; use the clinic s parking lot and entrance on the building s south side.                      Requested Prescriptions   Pending Prescriptions Disp Refills    amphetamine-dextroamphetamine (ADDERALL) 20 MG tablet 90 tablet 0     Sig: Take 1 tablet (20 mg) by mouth 3 times daily.       There is no refill protocol information for this order

## 2024-12-17 ENCOUNTER — VIRTUAL VISIT (OUTPATIENT)
Dept: FAMILY MEDICINE | Facility: CLINIC | Age: 34
End: 2024-12-17
Attending: INTERNAL MEDICINE

## 2024-12-17 DIAGNOSIS — F90.2 ATTENTION DEFICIT HYPERACTIVITY DISORDER (ADHD), COMBINED TYPE: ICD-10-CM

## 2024-12-17 PROCEDURE — 99441 PR PHYSICIAN TELEPHONE EVALUATION 5-10 MIN: CPT | Mod: 93 | Performed by: INTERNAL MEDICINE

## 2024-12-17 RX ORDER — DEXTROAMPHETAMINE SACCHARATE, AMPHETAMINE ASPARTATE, DEXTROAMPHETAMINE SULFATE AND AMPHETAMINE SULFATE 5; 5; 5; 5 MG/1; MG/1; MG/1; MG/1
20 TABLET ORAL 3 TIMES DAILY
Qty: 90 TABLET | Refills: 0 | Status: SHIPPED | OUTPATIENT
Start: 2024-12-17

## 2024-12-17 RX ORDER — DEXTROAMPHETAMINE SACCHARATE, AMPHETAMINE ASPARTATE, DEXTROAMPHETAMINE SULFATE AND AMPHETAMINE SULFATE 5; 5; 5; 5 MG/1; MG/1; MG/1; MG/1
20 TABLET ORAL 3 TIMES DAILY
Qty: 90 TABLET | Refills: 0 | Status: SHIPPED | OUTPATIENT
Start: 2025-01-17

## 2024-12-17 NOTE — ASSESSMENT & PLAN NOTE
12/17/2024  Doing well on stimulants  Able to complete tasks      6/7/2024  DX in his teens  Initially Ritalin  Amphetamine since 2016 in the [employer]  Doing well on stimulants.  He is employed as a heavy .  Able to complete tasks.

## 2024-12-17 NOTE — PROGRESS NOTES
"Kameron is a 34 year old who is being evaluated via a billable telephone visit.    What phone number would you like to be contacted at? [phone]  How would you like to obtain your AVS? Halie  Originating Location (pt. Location): Home    Distant Location (provider location):  On-site    Assessment & Plan   Problem List Items Addressed This Visit       Attention deficit hyperactivity disorder (ADHD)     12/17/2024  Doing well on stimulants  Able to complete tasks      6/7/2024  DX in his teens  Initially Ritalin  Amphetamine since 2016 in the [employer]  Doing well on stimulants.  He is employed as a heavy .  Able to complete tasks.         Relevant Medications    amphetamine-dextroamphetamine (ADDERALL) 20 MG tablet    amphetamine-dextroamphetamine (ADDERALL) 20 MG tablet (Start on 1/17/2025)             Nicotine/Tobacco Cessation  He reports that he has been smoking cigarettes. He has a 4 pack-year smoking history. He has never used smokeless tobacco.  Nicotine/Tobacco Cessation Plan  Information offered: Patient not interested at this time      BMI  Estimated body mass index is 32.2 kg/m  as calculated from the following:    Height as of 1/12/24: 1.899 m (6' 2.76\").    Weight as of 1/12/24: 116.1 kg (256 lb).   Weight management plan: Patient was referred to their PCP to discuss a diet and exercise plan.          Subjective   Kameron is a 34 year old, presenting for the following health issues:  A.D.H.D (Pt calling for an ADHD medcheck and refills)        6/7/2024     6:48 AM   Additional Questions   Roomed by Kwame REBOLLEDO     A.D.H.D       Follow-up visit for ADHD.  He is able to complete tasks.  Does not have concerns.  Works as a heavy .          Review of Systems  Constitutional, neuro, pulmonary, cardiac, gastrointestinal, genitourinary, and psychiatric systems are negative, except as otherwise noted.      Objective           Vitals:  No vitals were obtained today due to virtual " visit.    Physical Exam   General: Alert and no distress //Respiratory: No audible wheeze, cough, or shortness of breath // Psychiatric:  Appropriate affect, tone, and pace of words            Phone call duration: 10 minutes  Signed Electronically by: Delfino Dickson MD

## 2025-02-12 DIAGNOSIS — F90.2 ATTENTION DEFICIT HYPERACTIVITY DISORDER (ADHD), COMBINED TYPE: ICD-10-CM

## 2025-02-13 RX ORDER — DEXTROAMPHETAMINE SACCHARATE, AMPHETAMINE ASPARTATE, DEXTROAMPHETAMINE SULFATE AND AMPHETAMINE SULFATE 5; 5; 5; 5 MG/1; MG/1; MG/1; MG/1
20 TABLET ORAL 3 TIMES DAILY
Qty: 90 TABLET | Refills: 0 | Status: SHIPPED | OUTPATIENT
Start: 2025-02-13

## 2025-04-06 DIAGNOSIS — F90.2 ATTENTION DEFICIT HYPERACTIVITY DISORDER (ADHD), COMBINED TYPE: ICD-10-CM

## 2025-04-07 RX ORDER — DEXTROAMPHETAMINE SACCHARATE, AMPHETAMINE ASPARTATE, DEXTROAMPHETAMINE SULFATE AND AMPHETAMINE SULFATE 5; 5; 5; 5 MG/1; MG/1; MG/1; MG/1
20 TABLET ORAL 3 TIMES DAILY
Qty: 90 TABLET | Refills: 0 | OUTPATIENT
Start: 2025-04-07

## 2025-05-05 ENCOUNTER — MYC REFILL (OUTPATIENT)
Dept: FAMILY MEDICINE | Facility: CLINIC | Age: 35
End: 2025-05-05

## 2025-05-05 DIAGNOSIS — F90.2 ATTENTION DEFICIT HYPERACTIVITY DISORDER (ADHD), COMBINED TYPE: ICD-10-CM

## 2025-05-05 RX ORDER — DEXTROAMPHETAMINE SACCHARATE, AMPHETAMINE ASPARTATE, DEXTROAMPHETAMINE SULFATE AND AMPHETAMINE SULFATE 5; 5; 5; 5 MG/1; MG/1; MG/1; MG/1
20 TABLET ORAL 3 TIMES DAILY
Qty: 90 TABLET | Refills: 0 | OUTPATIENT
Start: 2025-05-05

## 2025-06-02 DIAGNOSIS — F90.2 ATTENTION DEFICIT HYPERACTIVITY DISORDER (ADHD), COMBINED TYPE: ICD-10-CM

## 2025-06-02 RX ORDER — DEXTROAMPHETAMINE SACCHARATE, AMPHETAMINE ASPARTATE, DEXTROAMPHETAMINE SULFATE AND AMPHETAMINE SULFATE 5; 5; 5; 5 MG/1; MG/1; MG/1; MG/1
20 TABLET ORAL 3 TIMES DAILY
Qty: 90 TABLET | Refills: 0 | Status: SHIPPED | OUTPATIENT
Start: 2025-06-02

## 2025-06-26 DIAGNOSIS — F90.2 ATTENTION DEFICIT HYPERACTIVITY DISORDER (ADHD), COMBINED TYPE: ICD-10-CM

## 2025-06-26 RX ORDER — DEXTROAMPHETAMINE SACCHARATE, AMPHETAMINE ASPARTATE, DEXTROAMPHETAMINE SULFATE AND AMPHETAMINE SULFATE 5; 5; 5; 5 MG/1; MG/1; MG/1; MG/1
20 TABLET ORAL 3 TIMES DAILY
Qty: 90 TABLET | Refills: 0 | Status: SHIPPED | OUTPATIENT
Start: 2025-06-26

## 2025-08-20 DIAGNOSIS — F90.2 ATTENTION DEFICIT HYPERACTIVITY DISORDER (ADHD), COMBINED TYPE: ICD-10-CM

## 2025-08-20 RX ORDER — DEXTROAMPHETAMINE SACCHARATE, AMPHETAMINE ASPARTATE, DEXTROAMPHETAMINE SULFATE AND AMPHETAMINE SULFATE 5; 5; 5; 5 MG/1; MG/1; MG/1; MG/1
20 TABLET ORAL 3 TIMES DAILY
Qty: 90 TABLET | Refills: 0 | Status: SHIPPED | OUTPATIENT
Start: 2025-08-20